# Patient Record
Sex: FEMALE | Race: ASIAN | NOT HISPANIC OR LATINO | Employment: UNEMPLOYED | URBAN - METROPOLITAN AREA
[De-identification: names, ages, dates, MRNs, and addresses within clinical notes are randomized per-mention and may not be internally consistent; named-entity substitution may affect disease eponyms.]

---

## 2022-12-06 PROBLEM — N91.2 AMENORRHEA: Status: ACTIVE | Noted: 2022-12-06

## 2022-12-06 NOTE — PROGRESS NOTES
Viability Scan Visit   2022    SUBJECTIVE:  CC: viability  HPI: Yennifer Rodriguez is a 32 y o   female who presents for a viability scan with an LMP of 10/4/2022  History reviewed  No pertinent past medical history  History reviewed  No pertinent surgical history  Social History     Tobacco Use   • Smoking status: Never   • Smokeless tobacco: Never   Substance Use Topics   • Alcohol use: Never   • Drug use: Never         Current Outpatient Medications:   •  doxylamine (UNISOM) 25 MG tablet, Take 1 tablet (25 mg total) by mouth daily at bedtime as needed for sleep, Disp: 30 tablet, Rfl: 1  •  ferrous sulfate 324 (65 Fe) mg, Take 1 tablet (324 mg total) by mouth 2 (two) times a day before meals, Disp: 90 tablet, Rfl: 1  •  folic acid (FOLVITE) 450 mcg tablet, Take 1 tablet (400 mcg total) by mouth daily, Disp: 90 tablet, Rfl: 1  •  Prenatal Vit-Fe Fumarate-FA (prenatal multivitamin) 28-0 8 MG TABS, Take 1 tablet by mouth daily, Disp: 30 tablet, Rfl: 0  •  pyridoxine (B-6) 25 MG tablet, Take 1 tablet (25 mg total) by mouth daily, Disp: 90 tablet, Rfl: 1      OBJECTIVE:  Vitals:    22 1158   BP: 120/85   BP Location: Right arm   Patient Position: Sitting   Cuff Size: Adult   Pulse: (!) 108   Resp: 18   Weight: 45 7 kg (100 lb 12 8 oz)   Height: 4' 10" (1 473 m)       GEN: The patient was alert and oriented x3, pleasant well-appearing female in no acute distress  PULM: nonlabored respirations  MSK: Normal gait  Skin: warm, dry  Neuro: no focal deficits  Psych: normal affect and judgement, cooperative  : Normal appearing external genitalia, vaginal mucosa, and cervix  Normal physiologic discharge present  No evidence of vaginal, cervical, or uterine bleeding  ASSESSMENT:  Yennifer Rodriguez is a 32 y o   female who presents for a viability scan      PLAN:  Problem List        Digestive    Nausea and vomiting during pregnancy    Overview     Vitamin B6 and unisom ordered            Other    Twin pregnancy in first trimester    Overview     - Dating by LMP with TAMIKO 7/11/2023, 1st tri ultrasounds consistent with this  - Likely [employer] however will await MFM U/S to confirm chorionicity  - Continue prenatal vitamin along with supplemental iron and folate due to multifetal gestation  - Prenatal labs: ordered  - Initial OB U/S: referral placed for MFM  - Genetic screening [ ]  - Order MSAFP?  [ ]   - Level 2 U/S: [ ]   - 28 week labs [ ]   - Tdap [ ]   - Rhogam [ ]   - Contraception plan: [ ]   - RTO in 4 weeks  - All questions answered to patient Kim Carbajal MD   PGY-2, OBGYN  12/08/22 12:59 PM

## 2022-12-08 ENCOUNTER — ULTRASOUND (OUTPATIENT)
Dept: OBGYN CLINIC | Facility: CLINIC | Age: 27
End: 2022-12-08

## 2022-12-08 VITALS
BODY MASS INDEX: 21.16 KG/M2 | HEIGHT: 58 IN | WEIGHT: 100.8 LBS | RESPIRATION RATE: 18 BRPM | HEART RATE: 108 BPM | DIASTOLIC BLOOD PRESSURE: 85 MMHG | SYSTOLIC BLOOD PRESSURE: 120 MMHG

## 2022-12-08 DIAGNOSIS — N91.2 AMENORRHEA: Primary | ICD-10-CM

## 2022-12-08 DIAGNOSIS — O21.9 NAUSEA AND VOMITING DURING PREGNANCY: ICD-10-CM

## 2022-12-08 DIAGNOSIS — O30.041 DICHORIONIC DIAMNIOTIC TWIN PREGNANCY IN FIRST TRIMESTER: ICD-10-CM

## 2022-12-08 PROBLEM — O30.001 TWIN PREGNANCY IN FIRST TRIMESTER: Status: ACTIVE | Noted: 2022-12-06

## 2022-12-08 RX ORDER — SWAB
1 SWAB, NON-MEDICATED MISCELLANEOUS DAILY
Qty: 30 TABLET | Refills: 0 | Status: SHIPPED | OUTPATIENT
Start: 2022-12-08

## 2022-12-08 RX ORDER — LANOLIN ALCOHOL/MO/W.PET/CERES
400 CREAM (GRAM) TOPICAL DAILY
Qty: 90 TABLET | Refills: 1 | Status: SHIPPED | OUTPATIENT
Start: 2022-12-08

## 2022-12-08 RX ORDER — FERROUS SULFATE TAB EC 324 MG (65 MG FE EQUIVALENT) 324 (65 FE) MG
324 TABLET DELAYED RESPONSE ORAL
Qty: 90 TABLET | Refills: 1 | Status: SHIPPED | OUTPATIENT
Start: 2022-12-08

## 2022-12-08 RX ORDER — PYRIDOXINE HCL (VITAMIN B6) 25 MG
25 TABLET ORAL DAILY
Qty: 90 TABLET | Refills: 1 | Status: SHIPPED | OUTPATIENT
Start: 2022-12-08

## 2022-12-20 ENCOUNTER — PATIENT OUTREACH (OUTPATIENT)
Dept: OBGYN CLINIC | Facility: CLINIC | Age: 27
End: 2022-12-20

## 2022-12-20 ENCOUNTER — INITIAL PRENATAL (OUTPATIENT)
Dept: OBGYN CLINIC | Facility: CLINIC | Age: 27
End: 2022-12-20

## 2022-12-20 DIAGNOSIS — Z34.91 PRENATAL CARE IN FIRST TRIMESTER: Primary | ICD-10-CM

## 2022-12-20 NOTE — LETTER
[facility] Letter    Travon Longoria  1995  [address] [place] [address]       12/20/22          Travon Longoria is a patient and under our care in our office  Maday Boss Estimated Date of Delivery: 7/11/23  Any questions or concerns feel free to contact our office       Thank you,  [address]  [id number] [facility]  [address] [facility]/Massiel Mejiadaniella [address] [place] [address]/[address]  [phone]

## 2022-12-20 NOTE — LETTER
Dentist Letter    Joyce Myers  1995  [facility] [place] [address]          12/20/22    We have had several requests from local dentist requesting permission to perform procedures on our patients who are pregnant  We wish to respond with this letter regarding some of the more routine procedures that we have been asked about  The following procedures may be performed on our obstetric patients:   1  Administration of local anesthesia   2  Administration of antibiotics such as PCN, Ampicillin, and Erythromycin  3  Administration of pain medications such as Tylenol, Tylenol with codeine, and if needed Percocet  4  Shielded X-rays    Should you have any questions, please do not hesitate to contact at [phone]          Sincerely,    1 [facility]   [phone]

## 2022-12-20 NOTE — LETTER
Proof of Pregnancy Letter    Momohermelindo Digna  1995  [address] [place] [address]        12/20/22      Vaibhav Sawyer is a patient at our facility  Vaibhav Sawyer Estimated Date of Delivery: 7/11/23       Any questions or concerns, please feel free to contact our office  Sincerely,    1 [facility]    [address] [facility], [facility]

## 2022-12-20 NOTE — PROGRESS NOTES
NAMITA BAUER spoke with 33 y/o-M-G1- Limited english speaking woman for prenatal assessment  Pt"s Aunt (Yoanna Tsai) assisted with interpretation during the assessment  Pt resides with her parents   is in [place]  Pt reported pregnancy was intended and is very happy  Pt denies any usage of drug, alcohol or smoking  Pt denies any Mental Health or Domestic Violence History  Pt is applying for MA and need to call [facility] for appointment  Pt is unemployed but denies financial concerns  Pt denies transportation issues  Pt denies any questions or concerns at this time  Pt is processing well the twin pregnancy  NAMITA BAUER explained her role and gave contact information

## 2022-12-20 NOTE — PROGRESS NOTES
OB INTAKE INTERVIEW  Pt presents for OB intake    OB History    Para Term  AB Living   1             SAB IAB Ectopic Multiple Live Births                  # Outcome Date GA Lbr Eduardo/2nd Weight Sex Delivery Anes PTL Lv   1 Current              Hx of  delivery prior to 36 weeks 6 days:  N/A   If yes, place a referral for cervical surveillance at 16 weeks  Last Menstrual Period:    Patient's last menstrual period was 10/04/2022 (exact date)  Ultrasound date: 2022  9 weeks 2 days     Estimated Date of Delivery: 23   by LMP  H&P visit scheduled  1/10/2023 @ 1130  with Dr Rolf Kendrick     Last pap smear: unknown, [facility]     Current Issues:  Constipation :   No  Headaches :   Yes  Cramping:  No  Spotting :   No  PICA cravings :  No  FOB Involved:   Yes  Planned pregnancy:  Yes  I have these concerns about this prenatal patient:   Pt able to understand most English with help of mother who was also present during visit  C/O nausea when taking iron supplements  States drinking tea helps   Encouraged to complete first trimester labs and schedule MFM ultrasounds  Pt reports being vegetarian  Does not eat meat  Will eat occasional eggs and drink milk  May need more in depth nutritional concealing re twin pregnancy      Interview education  • [facility] Pregnancy Essentials reviewed and discussed   • [facility] [address] Handout  • [facility] MFM Handout  • Discussed genetic testing  • Prenatal lab work: Scripts printed and given to pt  • Influenza vaccine given today: No  • Discussed Tdap vaccine  Immunizations: There is no immunization history on file for this patient  Depression Screening Follow-up Plan: Patient's depression screening was negative with an [place] score of  0  Clinically patient does not have depression  No treatment is required     Nurse/Family Partnership- referral placed:  Yes   If yes, place referral for nurse family partnership  BMI Counseling:  Tobacco Cessation Counseling: non-smoker  Infection Screening: Does the pt have a hx of MRSA? No  If yes- please follow MRSA protocol and obtain a nasal swab for MRSA culture  The patient was oriented to our practice and all questions were answered    Interviewed by: Jd Palencia RN 12/20/22

## 2022-12-20 NOTE — LETTER
Work Letter    Davin Babin  1995  [address] [place] [address]    Dear Davin Babin,      12/20/22        Your employee is a patient at [facility]  We recommend that all pregnant women:    1  Have a well-ventilated workspace  2  Wear low-heeled shoes  3  Work no more than 40 hours per week  4  Have a 15 minute break every 2 hours and at least 30 minutes for a meal break  5  Use good body mechanics by bending at your knees to avoid back strain and lift no more than 20 pounds without assistance  Will need assistance with lifting over 20 lbs  6  Have ready access to bathrooms and water  She may continue to work until her due date unless medical complications arise  We anticipate she may return to work in 6-8 weeks after delivery       Sincerely,  1 Brandie Rodney

## 2022-12-28 ENCOUNTER — APPOINTMENT (OUTPATIENT)
Dept: LAB | Facility: CLINIC | Age: 27
End: 2022-12-28

## 2022-12-28 DIAGNOSIS — O30.041 DICHORIONIC DIAMNIOTIC TWIN PREGNANCY IN FIRST TRIMESTER: ICD-10-CM

## 2022-12-28 LAB
ABO GROUP BLD: NORMAL
BACTERIA UR QL AUTO: ABNORMAL /HPF
BASOPHILS # BLD AUTO: 0.03 THOUSANDS/ÂΜL (ref 0–0.1)
BASOPHILS NFR BLD AUTO: 0 % (ref 0–1)
BILIRUB UR QL STRIP: NEGATIVE
BLD GP AB SCN SERPL QL: NEGATIVE
CAOX CRY URNS QL MICRO: ABNORMAL /HPF
CLARITY UR: ABNORMAL
COLOR UR: YELLOW
EOSINOPHIL # BLD AUTO: 0.03 THOUSAND/ÂΜL (ref 0–0.61)
EOSINOPHIL NFR BLD AUTO: 0 % (ref 0–6)
ERYTHROCYTE [DISTWIDTH] IN BLOOD BY AUTOMATED COUNT: 15.9 % (ref 11.6–15.1)
GLUCOSE UR STRIP-MCNC: NEGATIVE MG/DL
HBV SURFACE AG SER QL: NORMAL
HCT VFR BLD AUTO: 29.6 % (ref 34.8–46.1)
HGB BLD-MCNC: 10 G/DL (ref 11.5–15.4)
HGB UR QL STRIP.AUTO: NEGATIVE
HIV 1+2 AB+HIV1 P24 AG SERPL QL IA: NORMAL
HIV 2 AB SERPL QL IA: NORMAL
HIV1 AB SERPL QL IA: NORMAL
HIV1 P24 AG SERPL QL IA: NORMAL
IMM GRANULOCYTES # BLD AUTO: 0.04 THOUSAND/UL (ref 0–0.2)
IMM GRANULOCYTES NFR BLD AUTO: 0 % (ref 0–2)
KETONES UR STRIP-MCNC: NEGATIVE MG/DL
LEUKOCYTE ESTERASE UR QL STRIP: ABNORMAL
LYMPHOCYTES # BLD AUTO: 1.85 THOUSANDS/ÂΜL (ref 0.6–4.47)
LYMPHOCYTES NFR BLD AUTO: 18 % (ref 14–44)
MCH RBC QN AUTO: 24.4 PG (ref 26.8–34.3)
MCHC RBC AUTO-ENTMCNC: 33.8 G/DL (ref 31.4–37.4)
MCV RBC AUTO: 72 FL (ref 82–98)
MONOCYTES # BLD AUTO: 0.78 THOUSAND/ÂΜL (ref 0.17–1.22)
MONOCYTES NFR BLD AUTO: 8 % (ref 4–12)
MUCOUS THREADS UR QL AUTO: ABNORMAL
NEUTROPHILS # BLD AUTO: 7.56 THOUSANDS/ÂΜL (ref 1.85–7.62)
NEUTS SEG NFR BLD AUTO: 74 % (ref 43–75)
NITRITE UR QL STRIP: POSITIVE
NON-SQ EPI CELLS URNS QL MICRO: ABNORMAL /HPF
NRBC BLD AUTO-RTO: 0 /100 WBCS
PH UR STRIP.AUTO: 5.5 [PH]
PLATELET # BLD AUTO: 296 THOUSANDS/UL (ref 149–390)
PMV BLD AUTO: 9.6 FL (ref 8.9–12.7)
PROT UR STRIP-MCNC: ABNORMAL MG/DL
RBC # BLD AUTO: 4.1 MILLION/UL (ref 3.81–5.12)
RBC #/AREA URNS AUTO: ABNORMAL /HPF
RH BLD: NEGATIVE
RUBV IGG SERPL IA-ACNC: >175 IU/ML
SP GR UR STRIP.AUTO: 1.02 (ref 1–1.03)
SPECIMEN EXPIRATION DATE: NORMAL
UROBILINOGEN UR STRIP-ACNC: <2 MG/DL
WBC # BLD AUTO: 10.29 THOUSAND/UL (ref 4.31–10.16)
WBC #/AREA URNS AUTO: ABNORMAL /HPF

## 2022-12-29 DIAGNOSIS — O99.891 BACTERIURIA DURING PREGNANCY: Primary | ICD-10-CM

## 2022-12-29 DIAGNOSIS — R82.71 BACTERIURIA DURING PREGNANCY: Primary | ICD-10-CM

## 2022-12-29 LAB — RPR SER QL: NORMAL

## 2022-12-29 RX ORDER — AMOXICILLIN AND CLAVULANATE POTASSIUM 875; 125 MG/1; MG/1
1 TABLET, FILM COATED ORAL EVERY 12 HOURS SCHEDULED
Qty: 10 TABLET | Refills: 0 | Status: SHIPPED | OUTPATIENT
Start: 2022-12-29 | End: 2023-01-03

## 2022-12-31 LAB — BACTERIA UR CULT: ABNORMAL

## 2023-01-09 ENCOUNTER — ROUTINE PRENATAL (OUTPATIENT)
Dept: PERINATAL CARE | Facility: OTHER | Age: 28
End: 2023-01-09

## 2023-01-09 VITALS
HEIGHT: 58 IN | DIASTOLIC BLOOD PRESSURE: 70 MMHG | BODY MASS INDEX: 20.7 KG/M2 | HEART RATE: 112 BPM | SYSTOLIC BLOOD PRESSURE: 110 MMHG | WEIGHT: 98.6 LBS

## 2023-01-09 DIAGNOSIS — O30.031 MONOCHORIONIC DIAMNIOTIC TWIN GESTATION IN FIRST TRIMESTER: Primary | ICD-10-CM

## 2023-01-09 DIAGNOSIS — O30.041 DICHORIONIC DIAMNIOTIC TWIN PREGNANCY IN FIRST TRIMESTER: ICD-10-CM

## 2023-01-09 DIAGNOSIS — Z36.82 ENCOUNTER FOR NUCHAL TRANSLUCENCY TESTING: ICD-10-CM

## 2023-01-09 DIAGNOSIS — Z3A.13 13 WEEKS GESTATION OF PREGNANCY: ICD-10-CM

## 2023-01-09 RX ORDER — ASPIRIN 81 MG/1
162 TABLET ORAL DAILY
Qty: 180 TABLET | Refills: 3 | Status: SHIPPED | OUTPATIENT
Start: 2023-01-09

## 2023-01-09 RX ORDER — FOLIC ACID 1 MG/1
1 TABLET ORAL DAILY
Qty: 30 TABLET | Refills: 3 | Status: SHIPPED | OUTPATIENT
Start: 2023-01-09 | End: 2023-02-08

## 2023-01-09 RX ORDER — UREA 10 %
1 LOTION (ML) TOPICAL 2 TIMES DAILY
Qty: 180 TABLET | Refills: 2 | Status: SHIPPED | OUTPATIENT
Start: 2023-01-09 | End: 2023-04-09

## 2023-01-09 RX ORDER — FERROUS SULFATE TAB EC 324 MG (65 MG FE EQUIVALENT) 324 (65 FE) MG
324 TABLET DELAYED RESPONSE ORAL
Qty: 60 TABLET | Refills: 3 | Status: SHIPPED | OUTPATIENT
Start: 2023-01-09

## 2023-01-09 NOTE — PROGRESS NOTES
Autumn Dailey presents today for a genetic screening ultrasound  This is her first pregnancy  This is a spontaneously conceived twin pregnancy  Review of her semester ultrasound performed in the clinic clearly demonstrates a monochorionic twin pregnancy  She has no significant medical or surgical history  She takes prenatal vitamins  She has no significant substance use history or family medical history  A review of systems is significant for nausea vomiting in pregnancy for which she is taking vitamin B6  Please note, I communicated the patient utilizing [address] [facility]  [id number]  We discussed the options for genetic screening, including but not limited to first trimester screening, second trimester screening, combined first and second trimester screening, noninvasive prenatal testing (NIPT) for patients at high risk and diagnostic screening through the use of CVS and amniocentesis  We discussed the risks and benefits of each approach including the sensitivities and false positive rates as well as the difference between a screening test and a diagnostic test  At the conclusion of our discussion the patient declined maternal serum screening to delineate her risk for fetal aneuploidy  We discussed the increased maternal and fetal risks with multiple gestations  We reviewed specifically the increased risks associated with monochorionic twins which include, but are not limited to, increased risk for congenital birth defects and heart defects requiring fetal echocardiogram   We reviewed the increased risks of twin-twin transfusion and twin anemia polycythemia sequence which occurs approximately 10 to 15% of all monochorionic twins  We reviewed typical monitoring plan for monochorionic twin diamniotic twins which is twin-twin transfusion monitoring every 2 weeks starting at 16 weeks and fetal growth every 4 weeks starting at 16 weeks    An anatomy ultrasound is recommended at 20 weeks and a fetal echocardiogram is recommended at around 22 weeks  We recommend twice weekly antepartum surveillance starting at 30-32 weeks gestation  We reviewed the increased medical maternal complications of pregnancy including hypertensive disorders, gestational diabetes, peripartum cardiomyopathy, postpartum hemorrhage, malpresentation, and  delivery  We discussed increased fetal risks in pregnancy including discordant anomalies,  birth, and fetal growth restriction  We discussed that the mean gestational age of delivery of twins is 35 3 weeks, with 58 8% delivering less than 37 weeks gestation  I recommend delivery between 36-37 6/7 weeks in appropriately grown uncomplicated monochorionic twin pregnancies given the increased  morbidity and mortality outside of this range  Regarding micronutrient supplementation, I advise supplemental folic acid (1mg) as well as ferrous sulfate (extra 60-80mg iron) and calcium (1500mg)  For twin pregnancy, the IOM recommends a gestational weight gain of 37-54 lb for women of normal weight (BMI 18 5-24 9), 31-50 lb for overweight women (BMI 25-29 9), and 25-42 lb for obese women (BMI >30)  Glucola screening is traditionally recommended between 24 and 28 weeks gestation  There is an increased risk for ischemic placental disease and preeclampsia in twin pregnancies; therefore, recommend initiating low-dose aspirin therapy (162mg) in the 1st trimester which has been demonstrated to mitigate the risk for preeclampsia, fetal growth restriction, and in some cases,  birh  Thank you very much for allowing us to participate in the care of this very nice patient  Should you have any questions, please do not hesitate to contact our office  Portions of the record may have been created with voice recognition software  Occasional wrong word or "sound a like" substitutions may have occurred due to the inherent limitations of voice recognition software   Read the chart carefully and recognize, using context, where substitutions have occurred

## 2023-01-09 NOTE — LETTER
January 9, 2023     Matheus Mejia MD  [address]    Patient: Savana Marvin   YOB: 1995   Date of Visit: 1/9/2023       Dear Dr María Rodríguez: Thank you for referring Savana Marvin to me for evaluation  Below are my notes for this consultation  If you have questions, please do not hesitate to call me  I look forward to following your patient along with you  Sincerely,        Davide Wilkinson MD        CC: No Recipients  Davide Wilkinson MD  1/9/2023  9:55 AM  Sign when Signing Visit  Ryan Rodriguez presents today for a genetic screening ultrasound  This is her first pregnancy  This is a spontaneously conceived twin pregnancy  Review of her semester ultrasound performed in the clinic clearly demonstrates a monochorionic twin pregnancy  She has no significant medical or surgical history  She takes prenatal vitamins  She has no significant substance use history or family medical history  A review of systems is significant for nausea vomiting in pregnancy for which she is taking vitamin B6  Please note, I communicated the patient utilizing [facility]  [id number]  We discussed the options for genetic screening, including but not limited to first trimester screening, second trimester screening, combined first and second trimester screening, noninvasive prenatal testing (NIPT) for patients at high risk and diagnostic screening through the use of CVS and amniocentesis  We discussed the risks and benefits of each approach including the sensitivities and false positive rates as well as the difference between a screening test and a diagnostic test  At the conclusion of our discussion the patient declined maternal serum screening to delineate her risk for fetal aneuploidy  We discussed the increased maternal and fetal risks with multiple gestations    We reviewed specifically the increased risks associated with monochorionic twins which include, but are not limited to, increased risk for congenital birth defects and heart defects requiring fetal echocardiogram   We reviewed the increased risks of twin-twin transfusion and twin anemia polycythemia sequence which occurs approximately 10 to 15% of all monochorionic twins  We reviewed typical monitoring plan for monochorionic twin diamniotic twins which is twin-twin transfusion monitoring every 2 weeks starting at 16 weeks and fetal growth every 4 weeks starting at 16 weeks  An anatomy ultrasound is recommended at 20 weeks and a fetal echocardiogram is recommended at around 22 weeks  We recommend twice weekly antepartum surveillance starting at 30-32 weeks gestation  We reviewed the increased medical maternal complications of pregnancy including hypertensive disorders, gestational diabetes, peripartum cardiomyopathy, postpartum hemorrhage, malpresentation, and  delivery  We discussed increased fetal risks in pregnancy including discordant anomalies,  birth, and fetal growth restriction  We discussed that the mean gestational age of delivery of twins is 35 3 weeks, with 58 8% delivering less than 37 weeks gestation  I recommend delivery between 36-37 6/7 weeks in appropriately grown uncomplicated monochorionic twin pregnancies given the increased  morbidity and mortality outside of this range  Regarding micronutrient supplementation, I advise supplemental folic acid (1mg) as well as ferrous sulfate (extra 60-80mg iron) and calcium (1500mg)  For twin pregnancy, the IOM recommends a gestational weight gain of 37-54 lb for women of normal weight (BMI 18 5-24 9), 31-50 lb for overweight women (BMI 25-29 9), and 25-42 lb for obese women (BMI >30)  Glucola screening is traditionally recommended between 24 and 28 weeks gestation   There is an increased risk for ischemic placental disease and preeclampsia in twin pregnancies; therefore, recommend initiating low-dose aspirin therapy (162mg) in the 1st trimester which has been demonstrated to mitigate the risk for preeclampsia, fetal growth restriction, and in some cases,  birh  Thank you very much for allowing us to participate in the care of this very nice patient  Should you have any questions, please do not hesitate to contact our office  Portions of the record may have been created with voice recognition software  Occasional wrong word or "sound a like" substitutions may have occurred due to the inherent limitations of voice recognition software  Read the chart carefully and recognize, using context, where substitutions have occurred

## 2023-01-10 ENCOUNTER — ROUTINE PRENATAL (OUTPATIENT)
Dept: OBGYN CLINIC | Facility: CLINIC | Age: 28
End: 2023-01-10

## 2023-01-10 VITALS
DIASTOLIC BLOOD PRESSURE: 80 MMHG | HEIGHT: 58 IN | BODY MASS INDEX: 20.57 KG/M2 | WEIGHT: 98 LBS | HEART RATE: 102 BPM | SYSTOLIC BLOOD PRESSURE: 122 MMHG

## 2023-01-10 DIAGNOSIS — Z34.92 PRENATAL CARE IN SECOND TRIMESTER: Primary | ICD-10-CM

## 2023-01-10 DIAGNOSIS — B96.29 UTI DUE TO EXTENDED-SPECTRUM BETA LACTAMASE (ESBL) PRODUCING ESCHERICHIA COLI: ICD-10-CM

## 2023-01-10 DIAGNOSIS — O30.031 MONOCHORIONIC DIAMNIOTIC TWIN GESTATION IN FIRST TRIMESTER: ICD-10-CM

## 2023-01-10 DIAGNOSIS — Z16.12 UTI DUE TO EXTENDED-SPECTRUM BETA LACTAMASE (ESBL) PRODUCING ESCHERICHIA COLI: ICD-10-CM

## 2023-01-10 DIAGNOSIS — Z72.51 HIGH RISK HETEROSEXUAL BEHAVIOR: ICD-10-CM

## 2023-01-10 DIAGNOSIS — Z11.3 SCREEN FOR STD (SEXUALLY TRANSMITTED DISEASE): ICD-10-CM

## 2023-01-10 DIAGNOSIS — Z23 NEED FOR INFLUENZA VACCINATION: ICD-10-CM

## 2023-01-10 DIAGNOSIS — N39.0 UTI DUE TO EXTENDED-SPECTRUM BETA LACTAMASE (ESBL) PRODUCING ESCHERICHIA COLI: ICD-10-CM

## 2023-01-10 DIAGNOSIS — B37.31 YEAST VAGINITIS: ICD-10-CM

## 2023-01-10 DIAGNOSIS — Z3A.14 14 WEEKS GESTATION OF PREGNANCY: ICD-10-CM

## 2023-01-10 RX ORDER — NITROFURANTOIN 25; 75 MG/1; MG/1
100 CAPSULE ORAL 2 TIMES DAILY
Qty: 14 CAPSULE | Refills: 0 | Status: SHIPPED | OUTPATIENT
Start: 2023-01-10 | End: 2023-01-17

## 2023-01-10 NOTE — ASSESSMENT & PLAN NOTE
Previously treated with Augmentin which was shown to have intermediate sensitivity on culture results  - Will treat with Macrobid BID x 7 days   - Repeat urine culture in 3 weeks

## 2023-01-10 NOTE — PROGRESS NOTES
OB/GYN  PRENATAL H&P VISIT  Dennis Glass  1/10/2023  12:24 PM  Dr Live Malik MD      SUBJECTIVE  Patient is a  at 14w0d with monochorionic diamniotic twin pregnancy here for initial prenatal H&P  This is an intended and desired pregnancy  She is currently doing well  She is not currently employed  Reports having some nausea and vomiting  Not currently taking anything     Currently lives with her mother, father and brother  FOB currently lives in [facility]  Feels well-supported at home  No medications  No surgeries  She denies hx of STD/STI, denies a hx of TB or close contacts with persons with TB  She never had MRSA  She denies a family history of inheritable conditions such as physical or intellectual disabilities, birth defects, blood disorders, heart or neural tube defects  She recently traveled to [place]  No future travel planned    She denies use of nicotine or recreational drug use  She denies use of ETOH  She denies vaginal bleeding, cramping, leakage, abnormal discharge  OB History    Para Term  AB Living   1 0 0 0 0 0   SAB IAB Ectopic Multiple Live Births   0 0 0 0 0      # Outcome Date GA Lbr Eduardo/2nd Weight Sex Delivery Anes PTL Lv   1 Current                Review of Systems   Constitutional: Negative for chills and fever  HENT: Negative  Eyes: Negative for visual disturbance  Respiratory: Negative for shortness of breath  Cardiovascular: Negative for chest pain  Gastrointestinal: Positive for nausea and vomiting  Negative for abdominal pain, constipation and diarrhea  Genitourinary: Negative for dysuria, hematuria, vaginal bleeding, vaginal discharge and vaginal pain  Neurological: Negative for headaches  No past medical history on file  No past surgical history on file      Social History     Socioeconomic History   • Marital status: /Civil Union     Spouse name: Not on file   • Number of children: 0   • Years of education: 12   • Highest education level: High school graduate   Occupational History   • Not on file   Tobacco Use   • Smoking status: Never   • Smokeless tobacco: Never   Vaping Use   • Vaping Use: Never used   Substance and Sexual Activity   • Alcohol use: Never   • Drug use: Never   • Sexual activity: Yes     Partners: Male   Other Topics Concern   • Not on file   Social History Narrative   • Not on file     Social Determinants of Health     Financial Resource Strain: Low Risk    • Difficulty of Paying Living Expenses: Not hard at all   Food Insecurity: No Food Insecurity   • Worried About [address] [employer] in the Last Year: Never true   • Ran Out of Food in the Last Year: Never true   Transportation Needs: No Transportation Needs   • Lack of Transportation (Medical): No   • Lack of Transportation (Non-Medical): No   Physical Activity: Not on file   Stress: No Stress Concern Present   • Feeling of Stress : Only a little   Social Connections: Not on file   Intimate Partner Violence: Not on file   Housing Stability: Low Risk    • Unable to Pay for Housing in the Last Year: No   • Number of Places Lived in the Last Year: 1   • Unstable Housing in the Last Year: No       OBJECTIVE  Vitals:    01/10/23 1200   BP: 122/80   Pulse: 102     Physical Exam  Constitutional:       General: She is not in acute distress  Appearance: Normal appearance  She is well-developed and normal weight  She is not ill-appearing, toxic-appearing or diaphoretic  Genitourinary:      Vulva normal       Genitourinary Comments: Grossly normal appearing cervix  Copious thick white/yellow discharge throughout vagina present  Non foul smelling  No bleeding or cervical motion tenderness present   HENT:      Head: Normocephalic and atraumatic  Eyes:      General: No scleral icterus  Conjunctiva/sclera: Conjunctivae normal    Cardiovascular:      Rate and Rhythm: Normal rate     Pulmonary:      Effort: Pulmonary effort is normal    Abdominal: Palpations: Abdomen is soft  There is no mass  Tenderness: There is no abdominal tenderness  There is no guarding or rebound  Musculoskeletal:      Right lower leg: No edema  Left lower leg: No edema  Neurological:      General: No focal deficit present  Mental Status: She is alert and oriented to person, place, and time  Skin:     General: Skin is warm and dry  Psychiatric:         Mood and Affect: Mood normal          Behavior: Behavior normal    Vitals reviewed  FHT A - 150 bpm   FHT B - 148 bpm      ASSESSMENT AND PLAN    32 y o , , with /80   Pulse 102   Ht 4' 10" (1 473 m)   Wt 44 5 kg (98 lb)   LMP 10/04/2022 (Exact Date) , at 14w0d with mono-di twin gestation here for her prenatal H&P  Pregnancy: H&P completed today  PN Labs reviewed today - blood work WNL  Needs Hep C screening  Labor expectations discussed with patient, including appointment schedule, nutrition, exercise, medications, sexual intercourse, and nausea/vomiting  Patient's BMI is 20 48  Recommended weight gain is 25-40 lbs  - Continue daily ASA for PreE ppx    Screening: Pap smear collected  GC/CT collected  Patient declined genetic screening    - Flu vaccine given today    Consents: Delivery process including potential OVD and  reviewed  Sign delivery consent form at 28 weeks  Follow up: RTC in 4 weeks  Precautions regarding labor, leakage, bleeding, and fetal movement reviewed      Problem List Items Addressed This Visit        Genitourinary    UTI due to extended-spectrum beta lactamase (ESBL) producing Escherichia coli     Previously treated with Augmentin which was shown to have intermediate sensitivity on culture results  - Will treat with Macrobid BID x 7 days   - Repeat urine culture in 3 weeks          Relevant Medications    nitrofurantoin (MACROBID) 100 mg capsule    Yeast vaginitis     Likely secondary to recent abx use  Monistat vaginal suppository qd x 7d Relevant Medications    miconazole (MONISTAT 7) 100 mg vaginal suppository       Other    Monochorionic diamniotic twin gestation in first trimester    14 weeks gestation of pregnancy    Relevant Medications    miconazole (MONISTAT 7) 100 mg vaginal suppository    Other Relevant Orders    Hepatitis C antibody   Other Visit Diagnoses     Prenatal care in second trimester    -  Primary    Relevant Orders    Hepatitis C antibody    Liquid-based pap, screening    Chlamydia/GC amplified DNA by PCR    FLUZONE: influenza vaccine, quadrivalent, 0 5 mL (Completed)    Screen for STD (sexually transmitted disease)        Relevant Orders    Chlamydia/GC amplified DNA by PCR    High risk heterosexual behavior        Relevant Orders    Chlamydia/GC amplified DNA by PCR    Need for influenza vaccination        Relevant Orders    FLUZONE: influenza vaccine, quadrivalent, 0 5 mL (Completed)            Live Malik MD  1/10/2023  12:24 PM

## 2023-01-12 LAB
C TRACH DNA SPEC QL NAA+PROBE: NEGATIVE
N GONORRHOEA DNA SPEC QL NAA+PROBE: NEGATIVE

## 2023-01-18 LAB
LAB AP GYN PRIMARY INTERPRETATION: NORMAL
Lab: NORMAL
PATH INTERP SPEC-IMP: NORMAL

## 2023-01-24 PROBLEM — Z3A.16 16 WEEKS GESTATION OF PREGNANCY: Status: ACTIVE | Noted: 2023-01-10

## 2023-01-25 ENCOUNTER — ULTRASOUND (OUTPATIENT)
Dept: PERINATAL CARE | Facility: OTHER | Age: 28
End: 2023-01-25

## 2023-01-25 ENCOUNTER — APPOINTMENT (OUTPATIENT)
Dept: LAB | Facility: CLINIC | Age: 28
End: 2023-01-25

## 2023-01-25 ENCOUNTER — TELEPHONE (OUTPATIENT)
Dept: PERINATAL CARE | Facility: CLINIC | Age: 28
End: 2023-01-25

## 2023-01-25 VITALS
HEIGHT: 58 IN | DIASTOLIC BLOOD PRESSURE: 74 MMHG | BODY MASS INDEX: 21.58 KG/M2 | WEIGHT: 102.8 LBS | SYSTOLIC BLOOD PRESSURE: 118 MMHG | HEART RATE: 107 BPM

## 2023-01-25 DIAGNOSIS — Z3A.14 14 WEEKS GESTATION OF PREGNANCY: ICD-10-CM

## 2023-01-25 DIAGNOSIS — O30.031 MONOCHORIONIC DIAMNIOTIC TWIN GESTATION IN FIRST TRIMESTER: Primary | ICD-10-CM

## 2023-01-25 DIAGNOSIS — Z3A.16 16 WEEKS GESTATION OF PREGNANCY: ICD-10-CM

## 2023-01-25 DIAGNOSIS — Z34.92 PRENATAL CARE IN SECOND TRIMESTER: ICD-10-CM

## 2023-01-25 LAB — HCV AB SER QL: NORMAL

## 2023-01-25 NOTE — TELEPHONE ENCOUNTER
Received In- basket message from Dr Pérez Abraham - patient may need future referral to [employer], request to fax patient demographics/insurance to [facility] to initiate  insurance authorization  Upon Epic review patient was given self pay estimate for MFM visit today- no insurance active or pending  Phone call to [facility], s/w financial counselor Bianca Joshua, patient did not f/u with [employer] application, requested they contact patient  Phone call to [facility] MFM office, s/w Mary Magallon @ # [phone] patient can be referred for care however she must reach out to the [address] @ [phone] first    If patient is actively working w/ [employer] she can be scheduled  Phone call to [address] [place], [place] [address]janine Yordy via [address]    ( [facility]  # [id number]) explained to patient she needs to contact [address] Davionfloydlopez #'s provided   [phone]  Explained to patient if she needs care at [facility] she must f/u with the financial department  [facility] M[facility] cannot call for her, she will need to answer personal financial questions to apply for this assistance  Patient verbalized understanding    Confirmed next MFM appt 2/2/23 @  ( Dr Alberto Mina)

## 2023-01-25 NOTE — TELEPHONE ENCOUNTER
PRATIK US reports from 1/09/23 and today 1/25/23 faxed to [facility] [phone] , [facility] PRATIK received fax back records sent sent "Complete" @14:14

## 2023-01-25 NOTE — PROGRESS NOTES
Koffi Cao: Ms Nadia Stacy was seen today for TTTS surveillance  See ultrasound report under "OB Procedures" tab  The time spent on this established patient on the encounter date included 5 minutes previsit service time reviewing records and precharting, 15 minutes face-to-face service time counseling regarding results and coordinating care including use of Michael  and discuss case with [facility], and  5 minutes charting, totalling 25 minutes    Please don't hesitate to contact our office with any concerns or questions   -Byron Pruitt MD

## 2023-01-25 NOTE — PATIENT INSTRUCTIONS
Thank you for choosing us for your  care today  If you have any questions about your ultrasound or care, please do not hesitate to contact us or your primary obstetrician  Some general instructions for your pregnancy are:    Protect against coronavirus: get vaccinated - pregnant women are increased risk of severe COVID  Notify your primary care doctor if you have any symptoms  Exercise: Aim for 22 minutes per day (150 minutes per week) of regular exercise  Walking is great! Nutrition: aim for calcium-rich and iron-rich foods as well as healthy sources of protein  Learn about Preeclampsia: preeclampsia is a common, serious high blood pressure complication in pregnancy  A blood pressure of 132JMUW (systolic or top number) or 86JGPV (diastolic or bottom number) is not normal and needs evaluation by your doctor  Aspirin is sometimes prescribed in early pregnancy to prevent preeclampsia in women with risk factors - ask your obstetrician if you should be on this medication  If you smoke, try to reduce how many cigarettes you smoke or try to quit completely  Do not vape  Other warning signs to watch out for in pregnancy or postpartum: chest pain, obstructed breathing or shortness of breath, seizures, thoughts of hurting yourself or your baby, bleeding, a painful or swollen leg, fever, or headache (see [facility] POST-BIRTH Warning Signs campaign)  If these happen call 911  Itching is also not normal in pregnancy and if you experience this, especially over your hands and feet, potentially worse at night, notify your doctors

## 2023-02-01 ENCOUNTER — TELEPHONE (OUTPATIENT)
Dept: PERINATAL CARE | Facility: CLINIC | Age: 28
End: 2023-02-01

## 2023-02-01 DIAGNOSIS — O30.031 MONOCHORIONIC DIAMNIOTIC TWIN GESTATION IN FIRST TRIMESTER: ICD-10-CM

## 2023-02-01 RX ORDER — FOLIC ACID 1 MG/1
TABLET ORAL
Qty: 90 TABLET | Refills: 2 | Status: SHIPPED | OUTPATIENT
Start: 2023-02-01 | End: 2023-02-09

## 2023-02-01 NOTE — TELEPHONE ENCOUNTER
2/2/23 US @ [facility] PRATIK todd/ Dr Emy Manning

## 2023-02-01 NOTE — TELEPHONE ENCOUNTER
Phone call to patient via Michael  Deena Lee # [id number]  Confirmed MFM [address] appt 2/2/23 arrival 11:15 for US TTTS check  Patient states she has [place] but has not heard any information regarding approval   Encouraged patient to contact Dick Ambrocio office, even if [place] if [place], [employer]  is out of state coverage and per [facility] financial department Carlee Green) patient needs to contact them and answer financial questions prior to appts scheduled  Patient provided phone numbers for [facility] [place] hotline for financial assistance department and encouraged to her to call today  [phone]  Patient verbalized understanding and states she will have her brother call

## 2023-02-02 ENCOUNTER — TELEPHONE (OUTPATIENT)
Dept: PERINATAL CARE | Facility: CLINIC | Age: 28
End: 2023-02-02

## 2023-02-02 PROBLEM — O30.032 MONOCHORIONIC DIAMNIOTIC TWIN GESTATION IN SECOND TRIMESTER: Status: ACTIVE | Noted: 2022-12-06

## 2023-02-02 PROBLEM — F80.9 COMMUNICATION PROBLEM: Status: ACTIVE | Noted: 2023-02-02

## 2023-02-02 PROBLEM — Z3A.17 17 WEEKS GESTATION OF PREGNANCY: Status: ACTIVE | Noted: 2023-01-10

## 2023-02-02 NOTE — TELEPHONE ENCOUNTER
[address] message from Dr Jamse Givens- spoke with relative and they would like to try to go to [facility]  S/w Dr Silvia Fam - patient would like her brother or his wife Dr Arina Almonte contacted @ [phone]     3 way call to to patient and her brother David Boggs, patient gave her verbal consent that medical information can be discussed  Per Kevan  Patient would like to have her care at [facility]  He states an application was submitted for [employer], initially was denied due to paper work issue however new documents were submitted, no determination yet  Tata Davis was unaware that patient had received information on [address] [place] [address] [employer] assistance program however they now want to go to [facility]  Explained patients records are faxed to [address] [facility] and phone call will be made to Nurse Navigator @ [facility]  Provided them w/ [facility] contact #  Offered for them to contact [address] [place] [address] services for additional questions about insurance process Josue Israel [phone])   C/ Ronald Stevens 77 reports, OB records, patient demographics and copy of drivers license faxed to [facility] 'Complete" @ [phone]  Phone call to [facility], intake info given to [facility] and s/w nurse navigator Lucy Barajas [phone]  Lucy Barajas asked for Dr Zaheer Mccauley contact # for [facility] Dr Chrystal Mckeon  Phone call to Dr Silvia Fam and updated and received confirmation to share his cell # while on call w/ Lucy Barajas  Per Ary [facility] received US reports and will review

## 2023-02-03 NOTE — TELEPHONE ENCOUNTER
[facility]w Donna Madsen [phone], patient is scheduled Monday 2/6/23

## 2023-02-07 ENCOUNTER — TELEPHONE (OUTPATIENT)
Dept: PERINATAL CARE | Facility: CLINIC | Age: 28
End: 2023-02-07

## 2023-02-07 ENCOUNTER — ROUTINE PRENATAL (OUTPATIENT)
Dept: OBGYN CLINIC | Facility: CLINIC | Age: 28
End: 2023-02-07

## 2023-02-07 ENCOUNTER — PREP FOR PROCEDURE (OUTPATIENT)
Dept: OBGYN CLINIC | Facility: CLINIC | Age: 28
End: 2023-02-07

## 2023-02-07 ENCOUNTER — TELEPHONE (OUTPATIENT)
Dept: OBGYN CLINIC | Facility: CLINIC | Age: 28
End: 2023-02-07

## 2023-02-07 VITALS
DIASTOLIC BLOOD PRESSURE: 90 MMHG | SYSTOLIC BLOOD PRESSURE: 151 MMHG | HEIGHT: 58 IN | WEIGHT: 106 LBS | BODY MASS INDEX: 22.25 KG/M2 | HEART RATE: 97 BPM

## 2023-02-07 DIAGNOSIS — O02.1 MISSED ABORTION: Primary | ICD-10-CM

## 2023-02-07 DIAGNOSIS — O43.022 TWIN TO TWIN TRANSFUSION IN SECOND TRIMESTER: Primary | ICD-10-CM

## 2023-02-07 PROBLEM — Z3A.18 18 WEEKS GESTATION OF PREGNANCY: Status: ACTIVE | Noted: 2023-01-10

## 2023-02-07 NOTE — PROGRESS NOTES
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  She was seen at both [facility] this week as well as [facility] this morning  She has decided to proceed with termination of pregnancy  She is appropriately upset  I expressed my condolences  History reviewed  No pertinent past medical history  History reviewed  No pertinent surgical history  Objective:    Vitals: Blood pressure 151/90, pulse 97, height 4' 10" (1 473 m), weight 48 1 kg (106 lb), last menstrual period 10/04/2022  Body mass index is 22 15 kg/m²  Physical Exam  Constitutional:       General: She is not in acute distress  HENT:      Head: Normocephalic  Mouth/Throat:      Mouth: Mucous membranes are moist    Cardiovascular:      Rate and Rhythm: Normal rate  Pulmonary:      Effort: No respiratory distress  Abdominal:      General: Abdomen is flat  Palpations: Abdomen is soft  Musculoskeletal:         General: Normal range of motion  Skin:     General: Skin is warm and dry  Neurological:      Mental Status: She is alert and oriented to person, place, and time  Psychiatric:         Behavior: Behavior normal            Assessment/Plan:  Problem List Items Addressed This Visit        Other    Twin to twin transfusion in second trimester - Primary     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  · Patient was counseled in detail about options of expectant management, induction of labor, and D&E  · After discussing through the [address]  and with her sister in law (Dr Vito Juniors), Diego Yanes opted for a D&E   She would like footprints if it is possible, but understands it may not be possible  · We discussed this procedure in detail, preoperative cervical preparation with laminaria, operative technique, and surgical risks, including but not limited to bleeding, infection, injury to surrounding structures, uterine perforation, intrauterine scarring, and incomplete evacuation  We discussed that while risks of complication are low, the can be severe and may require more extensive surgical intervention (laparoscopy, laparotomy, or hysterectomy)  Reviewed same day surgery, expected recovery course, and follow up  Patient had all questions answered to her satisfaction  Informed consent was obtained  · In accordance with state guidelines, I reviewed with the patient the [facility] 24hr waiting protocol  We discussed her alternatives to D&E as above  She was offered to view the [facility] document on stages of pregnancy and declined; she was counseled that this resource is available online should she prefer to review later  She had no additional questions regarding this information  · She consents to blood transfusion, intraop photos, and CPR if needed  · She will actually come see me in the [facility] office for laminaria placement at 1:30 PM tomorrow (due to covering clinic attending not placing lams) and will be placed on the surgical schedule for her procedure Thursday afternoon  · Will send doxycycline tomorrow at time of laminaria placement  · She will need to sign Hospital Dispo paperwork (>16 weeks) which was not available in the clinic at time of her visit as well as the induced termination of pregnancy form  · She will require RhoGAM inpatient  · I also spoke with her sister in law - Dr Dago Bailey at her request, and she/their family will be able to bring her to the [address] [place] [address] for the procedure   The patient gave me permission to answer any questions Jigar Norris may have via [employer] if they think of anything else            Dr Antwan Vaughan was present for this patient encounter  Total time spent with patient: 61 minutes    Efren Young DO  2/7/2023  4:42 PM    Please note, all notes within the [facility] are written in medical terminology for other doctors to read  If you have a question about something you see in your chart, please don't hesitate to ask about it at your next appointment  All test results are immediately available through [employer] as well, and I will review results at my earliest opportunity and contact you with the appropriate urgency

## 2023-02-07 NOTE — LETTER
2023     Tayler Hoffn, [address] [place] [address]    Patient: Ramesh Dc   YOB: 1995   Date of Visit: 2023       Dear Dr Francis Rasmussen: Thank you for referring Ramesh Dc to me for evaluation  Below are my notes for this consultation  If you have questions, please do not hesitate to call me  I look forward to following your patient along with you  Sincerely,        Renata Benites DO        CC: MD Bernice Harrington MD Dawna Raid, DO  2023  4:43 PM  Sign when Signing Visit  OB/GYN VISIT  Ramesh Dc  2023  4:42 PM    [employer]  #[id number]     Subjective:     Ramesh Dc is a 32 y o   at 19w0d female who presents for counseling on  care  She has mono/di twins and was unfortunately diagnosed with 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  She was seen at both [facility] this week as well as [facility] this morning  She has decided to proceed with termination of pregnancy  She is appropriately upset  I expressed my condolences  History reviewed  No pertinent past medical history  History reviewed  No pertinent surgical history  Objective:    Vitals: Blood pressure 151/90, pulse 97, height 4' 10" (1 473 m), weight 48 1 kg (106 lb), last menstrual period 10/04/2022  Body mass index is 22 15 kg/m²  Physical Exam  Constitutional:       General: She is not in acute distress  HENT:      Head: Normocephalic  Mouth/Throat:      Mouth: Mucous membranes are moist    Cardiovascular:      Rate and Rhythm: Normal rate  Pulmonary:      Effort: No respiratory distress  Abdominal:      General: Abdomen is flat  Palpations: Abdomen is soft  Musculoskeletal:         General: Normal range of motion  Skin:     General: Skin is warm and dry     Neurological:      Mental Status: She is alert and oriented to person, place, and time  Psychiatric:         Behavior: Behavior normal            Assessment/Plan:  Problem List Items Addressed This Visit        Other    Twin to twin transfusion in second trimester - Primary     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  · Patient was counseled in detail about options of expectant management, induction of labor, and D&E  · After discussing through the [address]  and with her sister in law (Dr Tanner Juarez), Virgil Corley opted for a D&E  She would like footprints if it is possible, but understands it may not be possible  · We discussed this procedure in detail, preoperative cervical preparation with laminaria, operative technique, and surgical risks, including but not limited to bleeding, infection, injury to surrounding structures, uterine perforation, intrauterine scarring, and incomplete evacuation  We discussed that while risks of complication are low, the can be severe and may require more extensive surgical intervention (laparoscopy, laparotomy, or hysterectomy)  Reviewed same day surgery, expected recovery course, and follow up  Patient had all questions answered to her satisfaction  Informed consent was obtained  · In accordance with state guidelines, I reviewed with the patient the [facility] 24hr waiting protocol  We discussed her alternatives to D&E as above  She was offered to view the [facility] document on stages of pregnancy and declined; she was counseled that this resource is available online should she prefer to review later  She had no additional questions regarding this information    · She consents to blood transfusion, intraop photos, and CPR if needed  · She will actually come see me in the [facility] office for laminaria placement at 1:30 PM tomorrow (due to covering clinic attending not placing lams) and will be placed on the surgical schedule for her procedure Thursday afternoon  · Will send doxycycline tomorrow at time of laminaria placement  · She will need to sign Hospital Dispo paperwork (>16 weeks) which was not available in the clinic at time of her visit as well as the induced termination of pregnancy form  · She will require RhoGAM inpatient  · I also spoke with her sister in law - Dr Kenton Funes at her request, and she/their family will be able to bring her to the [address] [place] [address] for the procedure  The patient gave me permission to answer any questions Robinson Saldaña may have via [email or web address] if they think of anything else            Dr Tyler Lange was present for this patient encounter  Total time spent with patient: 63 minutes    Villa Cisneros DO  2/7/2023  4:42 PM    Please note, all notes within the [place] [facility] are written in medical terminology for other doctors to read  If you have a question about something you see in your chart, please don't hesitate to ask about it at your next appointment  All test results are immediately available through [employer] as well, and I will review results at my earliest opportunity and contact you with the appropriate urgency         Madelin Palma  2/7/2023  1:47 PM  Sign when Signing Visit  [facility] # [id number]

## 2023-02-07 NOTE — ASSESSMENT & PLAN NOTE
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  · Patient was counseled in detail about options of expectant management, induction of labor, and D&E  · After discussing through the [address]  and with her sister in law (Dr Jaren Machado), Guilherme Garber opted for a D&E  She would like footprints if it is possible, but understands it may not be possible  · We discussed this procedure in detail, preoperative cervical preparation with laminaria, operative technique, and surgical risks, including but not limited to bleeding, infection, injury to surrounding structures, uterine perforation, intrauterine scarring, and incomplete evacuation  We discussed that while risks of complication are low, the can be severe and may require more extensive surgical intervention (laparoscopy, laparotomy, or hysterectomy)  Reviewed same day surgery, expected recovery course, and follow up  Patient had all questions answered to her satisfaction  Informed consent was obtained  · In accordance with state guidelines, I reviewed with the patient the [facility] 24hr waiting protocol  We discussed her alternatives to D&E as above  She was offered to view the [facility] document on stages of pregnancy and declined; she was counseled that this resource is available online should she prefer to review later  She had no additional questions regarding this information  · She consents to blood transfusion, intraop photos, and CPR if needed  · She will actually come see me in the [facility] office for laminaria placement at 1:30 PM tomorrow (due to covering clinic attending not placing lams) and will be placed on the surgical schedule for her procedure Thursday afternoon     · Will send doxycycline tomorrow at time of laminaria placement  · She will need to sign Hospital Dispo paperwork (>16 weeks) which was not available in the clinic at time of her visit as well as the induced termination of pregnancy form  · She will require RhoGAM inpatient  · I also spoke with her sister in law - Dr Vazquez Files at her request, and she/their family will be able to bring her to the [facility] for the procedure   The patient gave me permission to answer any questions Taograham Wilson may have via [employer] if they think of anything else

## 2023-02-07 NOTE — TELEPHONE ENCOUNTER
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

## 2023-02-07 NOTE — TELEPHONE ENCOUNTER
Called the patient via [employer]  to review her interest in termination of pregnancy  Providers able to complete her termination are in the office and available this afternoon  Patient reports that she will present to the [facility] office between 2 and 3 pm for an appointment  All questions answered  Discussed case with Dr Tata Mena and Dr Nayana Rodgers MD  PGY-3 OB/GYN   2/15/2023 2:17 PM

## 2023-02-07 NOTE — H&P (VIEW-ONLY)
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  She was seen at both [facility] this week as well as [facility] this morning  She has decided to proceed with termination of pregnancy  She is appropriately upset  I expressed my condolences  History reviewed  No pertinent past medical history  History reviewed  No pertinent surgical history  Objective:    Vitals: Blood pressure 151/90, pulse 97, height 4' 10" (1 473 m), weight 48 1 kg (106 lb), last menstrual period 10/04/2022  Body mass index is 22 15 kg/m²  Physical Exam  Constitutional:       General: She is not in acute distress  HENT:      Head: Normocephalic  Mouth/Throat:      Mouth: Mucous membranes are moist    Cardiovascular:      Rate and Rhythm: Normal rate  Pulmonary:      Effort: No respiratory distress  Abdominal:      General: Abdomen is flat  Palpations: Abdomen is soft  Musculoskeletal:         General: Normal range of motion  Skin:     General: Skin is warm and dry  Neurological:      Mental Status: She is alert and oriented to person, place, and time  Psychiatric:         Behavior: Behavior normal            Assessment/Plan:  Problem List Items Addressed This Visit        Other    Twin to twin transfusion in second trimester - Primary     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  · Patient was counseled in detail about options of expectant management, induction of labor, and D&E  · After discussing through the [address] [facility] [address]  and with her sister in law (Dr Jessica Panda), Rosalba Phelan opted for a D&E   She would like footprints if it is possible, but understands it may not be possible  · We discussed this procedure in detail, preoperative cervical preparation with laminaria, operative technique, and surgical risks, including but not limited to bleeding, infection, injury to surrounding structures, uterine perforation, intrauterine scarring, and incomplete evacuation  We discussed that while risks of complication are low, the can be severe and may require more extensive surgical intervention (laparoscopy, laparotomy, or hysterectomy)  Reviewed same day surgery, expected recovery course, and follow up  Patient had all questions answered to her satisfaction  Informed consent was obtained  · In accordance with state guidelines, I reviewed with the patient the [facility] 24hr waiting protocol  We discussed her alternatives to D&E as above  She was offered to view the [facility] document on stages of pregnancy and declined; she was counseled that this resource is available online should she prefer to review later  She had no additional questions regarding this information  · She consents to blood transfusion, intraop photos, and CPR if needed  · She will actually come see me in the [address] [place] [address] office for laminaria placement at 1:30 PM tomorrow (due to covering clinic attending not placing lams) and will be placed on the surgical schedule for her procedure Thursday afternoon  · Will send doxycycline tomorrow at time of laminaria placement  · She will need to sign Hospital Dispo paperwork (>16 weeks) which was not available in the clinic at time of her visit as well as the induced termination of pregnancy form  · She will require [facility] inpatient  · I also spoke with her sister in law - Dr Tanner Juarez at her request, and she/their family will be able to bring her to the [facility] for the procedure   The patient gave me permission to answer any questions Joe Menendez may have via [employer] if they think of anything else            Dr Rafy Frank was present for this patient encounter  Total time spent with patient: 61 minutes    Clotilde Sneed DO  2/7/2023  4:42 PM    Please note, all notes within the [facility] are written in medical terminology for other doctors to read  If you have a question about something you see in your chart, please don't hesitate to ask about it at your next appointment  All test results are immediately available through [email or web address] as well, and I will review results at my earliest opportunity and contact you with the appropriate urgency

## 2023-02-08 ENCOUNTER — ANESTHESIA EVENT (OUTPATIENT)
Dept: PERIOP | Facility: HOSPITAL | Age: 28
End: 2023-02-08

## 2023-02-08 ENCOUNTER — PROCEDURE VISIT (OUTPATIENT)
Dept: OBGYN CLINIC | Facility: CLINIC | Age: 28
End: 2023-02-08

## 2023-02-08 VITALS
WEIGHT: 106 LBS | BODY MASS INDEX: 18.78 KG/M2 | SYSTOLIC BLOOD PRESSURE: 140 MMHG | HEIGHT: 63 IN | DIASTOLIC BLOOD PRESSURE: 68 MMHG

## 2023-02-08 DIAGNOSIS — O43.022 TWIN TO TWIN TRANSFUSION IN SECOND TRIMESTER: Primary | ICD-10-CM

## 2023-02-08 NOTE — PROGRESS NOTES
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  Past Medical History:   Diagnosis Date   • Exercise involving walking     2x/week   • Pregnant      Past Surgical History:   Procedure Laterality Date   • NO PAST SURGERIES         Objective:    Vitals: Blood pressure 140/68, height 5' 3" (1 6 m), weight 48 1 kg (106 lb), last menstrual period 10/04/2022  Body mass index is 18 78 kg/m²  Cervical dilation     Date/Time 2023 2:07 PM     Performed by  Rik Ceja DO     Authorized by Rik Ceja DO      Universal Protocol   Procedure performed by: (Dr Gina Rodrigues)  Consent: Verbal consent obtained  Risks and benefits: risks, benefits and alternatives were discussed  Consent given by: patient  Patient understanding: patient states understanding of the procedure being performed  Patient consent: the patient's understanding of the procedure matches consent given  Procedure consent: procedure consent matches procedure scheduled  Patient identity confirmed: verbally with patient        Local anesthesia used: yes     Anesthesia   Local anesthesia used: yes  Local Anesthetic: lidocaine 1% with epinephrine  Anesthetic total: 10 mL     Sedation   Patient sedated: no        Specimen: no    Culture: no   Procedure Details   Procedure Notes: Patient consented for laminaria placement for surgical D&E for 18 week D&E for indication of TTTS  Speculum was placed and 2cc of lidocaine w/ epi placed at 12 o clock site, then paracervical block at 5 and 7 o clock was performed  Tenaculum was applied  1 small laminaria, 2 medium laminaria, and 6 large laminaria were placed without complication  She tolerated very well  2 gauze were placed     Patient tolerance: patient tolerated the procedure well with no immediate complications Assessment/Plan:  Problem List Items Addressed This Visit        Other    Twin to twin transfusion in second trimester - Primary     For D&E on 2/9/23 [facility] main OR w/ Dr Frank Barajas & Dr Saji Hoskins         Relevant Orders    Cervical dilation       D/w Dr Diaz Edmondson, DO  2/8/2023  2:13 PM    Please note, all notes within the [facility] are written in medical terminology for other doctors to read  If you have a question about something you see in your chart, please don't hesitate to ask about it at your next appointment  All test results are immediately available through [employer] as well, and I will review results at my earliest opportunity and contact you with the appropriate urgency

## 2023-02-08 NOTE — PRE-PROCEDURE INSTRUCTIONS
Pre-Surgery Instructions:   Medication Instructions   • folic acid (FOLVITE) 1 mg tablet Hold day of surgery  • Prenatal Vit-Fe Fumarate-FA (prenatal multivitamin) 28-0 8 MG TABS Hold day of surgery  Lindsey-Michael --Reviewed preoperative medication and showering instructions with patient-Pt verbalized understanding  Instructed pt to stop taking ASA /NSAIDS and non prescribed vitamins and herbal supplements from now to surgery or per Dr Shayan Apodaca     May take Tylenol if needed and Instructed to take DOS medications with small sip of water  Instructed NPO past midnight prior to surgery ---surgery department will call the day before surgery with arrival time for DOS  Instructed to notify surgeon and family doctor office with any changes in health prior to surgery  Encouraged healthy hydration /nutrition and rest today and provided emotional support      Patient (Pt ) verbalized understanding of all instructions

## 2023-02-09 ENCOUNTER — ANESTHESIA (OUTPATIENT)
Dept: PERIOP | Facility: HOSPITAL | Age: 28
End: 2023-02-09

## 2023-02-09 ENCOUNTER — HOSPITAL ENCOUNTER (OUTPATIENT)
Facility: HOSPITAL | Age: 28
Setting detail: OUTPATIENT SURGERY
Discharge: HOME | End: 2023-02-09
Attending: OBSTETRICS & GYNECOLOGY | Admitting: OBSTETRICS & GYNECOLOGY

## 2023-02-09 VITALS
RESPIRATION RATE: 18 BRPM | DIASTOLIC BLOOD PRESSURE: 79 MMHG | TEMPERATURE: 99.4 F | HEART RATE: 105 BPM | HEIGHT: 63 IN | WEIGHT: 101.19 LBS | SYSTOLIC BLOOD PRESSURE: 149 MMHG | BODY MASS INDEX: 17.93 KG/M2 | OXYGEN SATURATION: 97 %

## 2023-02-09 DIAGNOSIS — Z33.2 ENCOUNTER FOR ELECTIVE TERMINATION OF PREGNANCY: ICD-10-CM

## 2023-02-09 DIAGNOSIS — Z98.890 STATUS POST D&C: Primary | ICD-10-CM

## 2023-02-09 DIAGNOSIS — O43.022 TWIN TO TWIN TRANSFUSION IN SECOND TRIMESTER: ICD-10-CM

## 2023-02-09 LAB
ALBUMIN SERPL BCP-MCNC: 3 G/DL (ref 3.5–5)
ALP SERPL-CCNC: 74 U/L (ref 34–104)
ALT SERPL W P-5'-P-CCNC: 35 U/L (ref 7–52)
ANION GAP SERPL CALCULATED.3IONS-SCNC: 12 MMOL/L (ref 4–13)
AST SERPL W P-5'-P-CCNC: 33 U/L (ref 13–39)
BACTERIA UR QL AUTO: ABNORMAL /HPF
BILIRUB SERPL-MCNC: 0.36 MG/DL (ref 0.2–1)
BILIRUB UR QL STRIP: NEGATIVE
BUN SERPL-MCNC: 8 MG/DL (ref 5–25)
CALCIUM ALBUM COR SERPL-MCNC: 9 MG/DL (ref 8.3–10.1)
CALCIUM SERPL-MCNC: 8.2 MG/DL (ref 8.4–10.2)
CHLORIDE SERPL-SCNC: 105 MMOL/L (ref 96–108)
CLARITY UR: CLEAR
CO2 SERPL-SCNC: 18 MMOL/L (ref 21–32)
COLOR UR: YELLOW
CREAT SERPL-MCNC: 0.57 MG/DL (ref 0.6–1.3)
CREAT UR-MCNC: 133.1 MG/DL
CREAT UR-MCNC: 133.5 MG/DL
ERYTHROCYTE [DISTWIDTH] IN BLOOD BY AUTOMATED COUNT: 15 % (ref 11.6–15.1)
GFR SERPL CREATININE-BSD FRML MDRD: 125 ML/MIN/1.73SQ M
GLUCOSE P FAST SERPL-MCNC: 88 MG/DL (ref 65–99)
GLUCOSE SERPL-MCNC: 88 MG/DL (ref 65–140)
GLUCOSE UR STRIP-MCNC: NEGATIVE MG/DL
HCT VFR BLD AUTO: 26.7 % (ref 34.8–46.1)
HGB BLD-MCNC: 8.8 G/DL (ref 11.5–15.4)
HGB UR QL STRIP.AUTO: ABNORMAL
KETONES UR STRIP-MCNC: ABNORMAL MG/DL
LEUKOCYTE ESTERASE UR QL STRIP: NEGATIVE
MCH RBC QN AUTO: 24.7 PG (ref 26.8–34.3)
MCHC RBC AUTO-ENTMCNC: 33 G/DL (ref 31.4–37.4)
MCV RBC AUTO: 75 FL (ref 82–98)
MUCOUS THREADS UR QL AUTO: ABNORMAL
NITRITE UR QL STRIP: NEGATIVE
NON-SQ EPI CELLS URNS QL MICRO: ABNORMAL /HPF
PH UR STRIP.AUTO: 6.5 [PH]
PLATELET # BLD AUTO: 218 THOUSANDS/UL (ref 149–390)
PMV BLD AUTO: 10.6 FL (ref 8.9–12.7)
POTASSIUM SERPL-SCNC: 4.3 MMOL/L (ref 3.5–5.3)
PROT SERPL-MCNC: 5.9 G/DL (ref 6.4–8.4)
PROT UR STRIP-MCNC: ABNORMAL MG/DL
PROT UR-MCNC: 1436 MG/DL
PROT UR-MCNC: 1441 MG/DL
PROT/CREAT UR: 10.76 MG/G{CREAT} (ref 0–0.1)
PROT/CREAT UR: 10.83 MG/G{CREAT} (ref 0–0.1)
RBC # BLD AUTO: 3.56 MILLION/UL (ref 3.81–5.12)
RBC #/AREA URNS AUTO: ABNORMAL /HPF
SODIUM SERPL-SCNC: 135 MMOL/L (ref 135–147)
SP GR UR STRIP.AUTO: 1.02 (ref 1–1.03)
UROBILINOGEN UR STRIP-ACNC: <2 MG/DL
WBC # BLD AUTO: 14.77 THOUSAND/UL (ref 4.31–10.16)
WBC #/AREA URNS AUTO: ABNORMAL /HPF

## 2023-02-09 RX ORDER — FENTANYL CITRATE 50 UG/ML
INJECTION, SOLUTION INTRAMUSCULAR; INTRAVENOUS AS NEEDED
Status: DISCONTINUED | OUTPATIENT
Start: 2023-02-09 | End: 2023-02-09

## 2023-02-09 RX ORDER — DOXYCYCLINE 100 MG/10ML
200 INJECTION, POWDER, LYOPHILIZED, FOR SOLUTION INTRAVENOUS ONCE
Status: DISCONTINUED | OUTPATIENT
Start: 2023-02-09 | End: 2023-02-09

## 2023-02-09 RX ORDER — MISOPROSTOL 200 UG/1
800 TABLET ORAL ONCE
Status: COMPLETED | OUTPATIENT
Start: 2023-02-09 | End: 2023-02-09

## 2023-02-09 RX ORDER — SUCCINYLCHOLINE/SOD CL,ISO/PF 100 MG/5ML
SYRINGE (ML) INTRAVENOUS AS NEEDED
Status: DISCONTINUED | OUTPATIENT
Start: 2023-02-09 | End: 2023-02-09

## 2023-02-09 RX ORDER — LABETALOL HYDROCHLORIDE 5 MG/ML
10 INJECTION, SOLUTION INTRAVENOUS ONCE
Status: COMPLETED | OUTPATIENT
Start: 2023-02-09 | End: 2023-02-09

## 2023-02-09 RX ORDER — ACETAMINOPHEN 325 MG/1
975 TABLET ORAL EVERY 6 HOURS PRN
Status: DISCONTINUED | OUTPATIENT
Start: 2023-02-09 | End: 2023-02-09 | Stop reason: HOSPADM

## 2023-02-09 RX ORDER — IBUPROFEN 600 MG/1
600 TABLET ORAL EVERY 6 HOURS PRN
Status: DISCONTINUED | OUTPATIENT
Start: 2023-02-09 | End: 2023-02-09 | Stop reason: HOSPADM

## 2023-02-09 RX ORDER — KETOROLAC TROMETHAMINE 30 MG/ML
INJECTION, SOLUTION INTRAMUSCULAR; INTRAVENOUS AS NEEDED
Status: DISCONTINUED | OUTPATIENT
Start: 2023-02-09 | End: 2023-02-09

## 2023-02-09 RX ORDER — LIDOCAINE HYDROCHLORIDE 10 MG/ML
INJECTION, SOLUTION EPIDURAL; INFILTRATION; INTRACAUDAL; PERINEURAL AS NEEDED
Status: DISCONTINUED | OUTPATIENT
Start: 2023-02-09 | End: 2023-02-09

## 2023-02-09 RX ORDER — DEXAMETHASONE SODIUM PHOSPHATE 10 MG/ML
INJECTION, SOLUTION INTRAMUSCULAR; INTRAVENOUS AS NEEDED
Status: DISCONTINUED | OUTPATIENT
Start: 2023-02-09 | End: 2023-02-09

## 2023-02-09 RX ORDER — ROCURONIUM BROMIDE 10 MG/ML
INJECTION, SOLUTION INTRAVENOUS AS NEEDED
Status: DISCONTINUED | OUTPATIENT
Start: 2023-02-09 | End: 2023-02-09

## 2023-02-09 RX ORDER — LOPERAMIDE HYDROCHLORIDE 2 MG/1
2 CAPSULE ORAL ONCE
Status: CANCELLED | OUTPATIENT
Start: 2023-02-09

## 2023-02-09 RX ORDER — ONDANSETRON 2 MG/ML
INJECTION INTRAMUSCULAR; INTRAVENOUS AS NEEDED
Status: DISCONTINUED | OUTPATIENT
Start: 2023-02-09 | End: 2023-02-09

## 2023-02-09 RX ORDER — SODIUM CHLORIDE, SODIUM LACTATE, POTASSIUM CHLORIDE, CALCIUM CHLORIDE 600; 310; 30; 20 MG/100ML; MG/100ML; MG/100ML; MG/100ML
INJECTION, SOLUTION INTRAVENOUS CONTINUOUS PRN
Status: DISCONTINUED | OUTPATIENT
Start: 2023-02-09 | End: 2023-02-09

## 2023-02-09 RX ORDER — MAGNESIUM HYDROXIDE 1200 MG/15ML
LIQUID ORAL AS NEEDED
Status: DISCONTINUED | OUTPATIENT
Start: 2023-02-09 | End: 2023-02-09 | Stop reason: HOSPADM

## 2023-02-09 RX ORDER — MIDAZOLAM HYDROCHLORIDE 2 MG/2ML
INJECTION, SOLUTION INTRAMUSCULAR; INTRAVENOUS AS NEEDED
Status: DISCONTINUED | OUTPATIENT
Start: 2023-02-09 | End: 2023-02-09

## 2023-02-09 RX ORDER — ONDANSETRON 2 MG/ML
4 INJECTION INTRAMUSCULAR; INTRAVENOUS EVERY 6 HOURS PRN
Status: DISCONTINUED | OUTPATIENT
Start: 2023-02-09 | End: 2023-02-09 | Stop reason: HOSPADM

## 2023-02-09 RX ORDER — PROPOFOL 10 MG/ML
INJECTION, EMULSION INTRAVENOUS AS NEEDED
Status: DISCONTINUED | OUTPATIENT
Start: 2023-02-09 | End: 2023-02-09

## 2023-02-09 RX ORDER — IBUPROFEN 600 MG/1
600 TABLET ORAL EVERY 6 HOURS PRN
Qty: 30 TABLET | Refills: 0
Start: 2023-02-09

## 2023-02-09 RX ORDER — LIDOCAINE HYDROCHLORIDE AND EPINEPHRINE 10; 10 MG/ML; UG/ML
INJECTION, SOLUTION INFILTRATION; PERINEURAL AS NEEDED
Status: DISCONTINUED | OUTPATIENT
Start: 2023-02-09 | End: 2023-02-09 | Stop reason: HOSPADM

## 2023-02-09 RX ORDER — DOXYCYCLINE HYCLATE 100 MG/1
200 CAPSULE ORAL ONCE
Status: COMPLETED | OUTPATIENT
Start: 2023-02-09 | End: 2023-02-09

## 2023-02-09 RX ORDER — HYDROMORPHONE HCL/PF 1 MG/ML
SYRINGE (ML) INJECTION AS NEEDED
Status: DISCONTINUED | OUTPATIENT
Start: 2023-02-09 | End: 2023-02-09

## 2023-02-09 RX ADMIN — SODIUM CHLORIDE, SODIUM LACTATE, POTASSIUM CHLORIDE, AND CALCIUM CHLORIDE: .6; .31; .03; .02 INJECTION, SOLUTION INTRAVENOUS at 12:56

## 2023-02-09 RX ADMIN — SODIUM CHLORIDE, SODIUM LACTATE, POTASSIUM CHLORIDE, AND CALCIUM CHLORIDE: .6; .31; .03; .02 INJECTION, SOLUTION INTRAVENOUS at 14:54

## 2023-02-09 RX ADMIN — SUGAMMADEX 100 MG: 100 INJECTION, SOLUTION INTRAVENOUS at 14:55

## 2023-02-09 RX ADMIN — ACETAMINOPHEN 975 MG: 325 TABLET ORAL at 17:23

## 2023-02-09 RX ADMIN — IRON SUCROSE 200 MG: 20 INJECTION, SOLUTION INTRAVENOUS at 16:48

## 2023-02-09 RX ADMIN — Medication 100 MG: at 14:04

## 2023-02-09 RX ADMIN — MISOPROSTOL 800 MCG: 200 TABLET ORAL at 12:50

## 2023-02-09 RX ADMIN — ONDANSETRON 4 MG: 2 INJECTION INTRAMUSCULAR; INTRAVENOUS at 13:50

## 2023-02-09 RX ADMIN — SODIUM CHLORIDE, SODIUM LACTATE, POTASSIUM CHLORIDE, AND CALCIUM CHLORIDE: .6; .31; .03; .02 INJECTION, SOLUTION INTRAVENOUS at 14:06

## 2023-02-09 RX ADMIN — DEXAMETHASONE SODIUM PHOSPHATE 8 MG: 10 INJECTION, SOLUTION INTRAMUSCULAR; INTRAVENOUS at 14:19

## 2023-02-09 RX ADMIN — PROPOFOL 150 MG: 10 INJECTION, EMULSION INTRAVENOUS at 14:04

## 2023-02-09 RX ADMIN — MIDAZOLAM HYDROCHLORIDE 2 MG: 1 INJECTION, SOLUTION INTRAMUSCULAR; INTRAVENOUS at 13:57

## 2023-02-09 RX ADMIN — HYDROMORPHONE HYDROCHLORIDE 0.25 MG: 1 INJECTION, SOLUTION INTRAMUSCULAR; INTRAVENOUS; SUBCUTANEOUS at 15:04

## 2023-02-09 RX ADMIN — LIDOCAINE HYDROCHLORIDE 50 MG: 10 INJECTION, SOLUTION EPIDURAL; INFILTRATION; INTRACAUDAL at 14:04

## 2023-02-09 RX ADMIN — ROCURONIUM BROMIDE 25 MG: 10 SOLUTION INTRAVENOUS at 14:18

## 2023-02-09 RX ADMIN — DOXYCYCLINE 100 MG: 100 INJECTION, POWDER, LYOPHILIZED, FOR SOLUTION INTRAVENOUS at 15:00

## 2023-02-09 RX ADMIN — LABETALOL HYDROCHLORIDE 10 MG: 5 INJECTION, SOLUTION INTRAVENOUS at 15:38

## 2023-02-09 RX ADMIN — DOXYCYCLINE 200 MG: 100 CAPSULE ORAL at 13:24

## 2023-02-09 RX ADMIN — FENTANYL CITRATE 50 MCG: 50 INJECTION, SOLUTION INTRAMUSCULAR; INTRAVENOUS at 14:18

## 2023-02-09 RX ADMIN — TRANEXAMIC ACID 1000 MG: 1 INJECTION, SOLUTION INTRAVENOUS at 14:55

## 2023-02-09 RX ADMIN — PROPOFOL 50 MG: 10 INJECTION, EMULSION INTRAVENOUS at 14:55

## 2023-02-09 RX ADMIN — KETOROLAC TROMETHAMINE 15 MG: 30 INJECTION, SOLUTION INTRAMUSCULAR at 14:43

## 2023-02-09 RX ADMIN — FENTANYL CITRATE 50 MCG: 50 INJECTION, SOLUTION INTRAMUSCULAR; INTRAVENOUS at 14:04

## 2023-02-09 NOTE — ANESTHESIA PREPROCEDURE EVALUATION
Procedure:  DILATATION AND EVACUATION (D&E) (18 WEEKS) (Uterus)    Relevant Problems   GYN   (+) 18 weeks gestation of pregnancy        Physical Exam    Airway       Dental   Comment: None loose, No notable dental hx     Cardiovascular      Pulmonary      Other Findings        Anesthesia Plan  ASA Score- 2     Anesthesia Type- general with ASA Monitors  Additional Monitors:   Airway Plan: ETT  Comment: NPO after MN    RSI despite NPO status due to stage of pregnancy  Plan Factors-Exercise tolerance (METS): >4 METS  Chart reviewed  Patient summary reviewed  Patient is not a current smoker  Induction- intravenous and rapid sequence induction  Postoperative Plan- Plan for postoperative opioid use  Planned trial extubation    Informed Consent- Anesthetic plan and risks discussed with patient ( [facility] [id number])  I personally reviewed this patient with the CRNA  Discussed and agreed on the Anesthesia Plan with the CRNA  Manny Ruvalcaba

## 2023-02-09 NOTE — INTERVAL H&P NOTE
H&P reviewed  After examining the patient I find no changes in the patients condition since the H&P had been written  She had some mild cramping/bleeding w/ laminaria  To OR for D&E for Stage IV TTTS, 18 weeks  Doxycycline 200mg for abx ppx  Heart and lung exam wnl  Hospital disposition  RTO in 2 weeks for post op visit w/ family planning resident       Vitals:    02/09/23 1235   BP: 156/85   Pulse: 95   Resp: 18   Temp: 98 8 °F (37 1 °C)   SpO2: 97%

## 2023-02-09 NOTE — ANESTHESIA PREPROCEDURE EVALUATION
Procedure:  DILATATION AND EVACUATION (D&E) (18 WEEKS) (Uterus)    Relevant Problems   GYN   (+) 18 weeks gestation of pregnancy

## 2023-02-09 NOTE — ANESTHESIA POSTPROCEDURE EVALUATION
Post-Op Assessment Note    CV Status:  Stable  Pain Score: 0    Pain management: adequate     Mental Status:  Sleepy and arousable   Hydration Status:  Euvolemic   PONV Controlled:  Controlled   Airway Patency:  Patent      Post Op Vitals Reviewed: Yes      Staff: CRNA         No notable events documented      BP   155/97   Temp   99 8   Pulse  144   Resp   40   SpO2   100% 4L

## 2023-02-09 NOTE — PROGRESS NOTES
Tianna Hawkins has had mild range Bps (140s-150s/70-80s) since diagnosis of her TTTS  This was initially attributed to anxiety, but given multiple gestation, CBC, CMP, P:Cr were collected  UPC resulted as 10 83  This will be re-run to verify  We discussed potential for mirror syndrome, and she was offered observation overnight for elevated Bps and repeat labs in the morning, however she prefers to go home  We discussed following up in the office for a blood pressure check and outpatient labs in 1 week, and she was given return precautions       D/w Dr Renuka Harper and Dr Val Ramos

## 2023-02-09 NOTE — DISCHARGE INSTRUCTIONS
Post-Gynecologic Surgery Discharge Instructions:  1  No heavy lifting more than one full gallon of milk (about 8 lbs) for 1 week  2  Nothing in the vagina for 6 weeks, or until cleared at your post-operative visit  3  You may take stairs one at a time, touching each step with both feet for the first few days, then as tolerated  4  Call the office for fever greater than 100  4'F, heavy vaginal bleeding, or increasing pain  5  Activity as tolerated  6  Avoid driving if taking narcotic pain medications (Roxicodone, Percocet, Vicodin)  Post Operative Pain Management:  For pain, take 650 mg of tylenol every 6 hours  For cramping, take 600 mg Ibuprofen every 6 hours to relieve  You may alternate these and take them "around the clock" to stay ahead of pain  For example, take 650mg of tylenol at 9 AM, then 600mg of ibuprofen at 12 PM, then 650 of tylenol at 3 PM, and so forth  If you have any questions regarding your prescriptions please call your doctor        ?????-????????????? ?????? ????????? ???????:  1  1 ?????? ?? ??? ?? ?? ???? ????? ???? ??? (???? 8 ?????) ???? ??????  2  ???? ??? 6 ?????? ?? ??? ?? ????, ?? ?? ?? ?? ???? ?????-??????? ?????? ?? ??? ???? ?? ????  3  ?? ??-?? ???? ???????? ??? ???? ???, ???? ??? ????? ?? ??? ????? ????? ?? ???????? ??? ?? ?? ???? ???, ??? ??? ???? ?? ???? ???  4  100 4 '?? ?? ???? ?????, ???? ???? ?????????, ?? ????? ???? ?? ??? ???????? ?? ??? ?????  5  ??????? ?? ??? ???? ???? ???  6  ??? ????????? ????    ????? ??????? ???? ???????:  ???? ?? ???, ?? 6 ???? ??? 650 ????????? ???????? ????   ???? ?? ???, ???? ???? ?? ??? ?? 6 ???? ??? 600 ????????? ?????????? ????     ?? ?????? ???????? ?? ???? ??? ?? ???? ?? ??? ???? ?? ??? ?????? "??????? ????" ?? ???? ???? ?????? ?? ???, ???? 9 ??? 650 ????????? ???????? ???, ??? 12 ??? 600 ????????? ?????????? ???, ??? 3 ??? ???????? ?? 650, ?? ??? ????     ??? ???? ??? ???? ?????? ?? ???? ??? ??? ?????? ??? ?? ????? ???? ?????? ?? ???????    post operative alvino cervantes:  dard ke liye, ranjan 6 ghante mein 650 milligram tylenol cali  ainthan ke liye, jasmina dene ke liye ranjan 6 ghante mein 600 milligram ibuprofen cali    aap inhen vaikalpik eh sakte jim aur dard se aage rahane ke lie unhen "chaubison ghnte" le [facility] ke lie, subah 9 baje 650 milligram tylenol lein, fir 12 baje 600 milligram ibuprofen lein, fir 3 baje tylenol ke 650, aur guido tarha    adelfo aapke pass apne nuskhe ke baare mein koi prashn jim to [facility] doctor aleyda maradiaga

## 2023-02-09 NOTE — OP NOTE
OPERATIVE REPORT  PATIENT NAME: Tanna Rosado    :  1995  MRN: [id number]  Pt Location: [facility] OR ROOM 01    SURGERY DATE: 2023    Surgeon(s) and Role:     * Jose De Jesus Bolivar DO - Primary     * Jayme Caro DO - Assisting    Preop Diagnosis:  Encounter for elective termination of pregnancy [Z33 2]    Post-Op Diagnosis Codes:     * Encounter for elective termination of pregnancy [Z33 2]    Procedure(s):  DILATATION AND EVACUATION (D&E) (18 WEEKS)    Specimen(s):  ID Type Source Tests Collected by Time Destination   1 : products of conception Tissue Products of Conception TISSUE EXAM Jose De Jesus Bolivar DO 2023 1429        Estimated Blood Loss:   500 mL    Drains: None    Anesthesia Type:   General    Operative Indications:  Tanna Rosado is a 33 yo  with 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  After counseling on her options for the pregnancy, she elected to terminate the pregnancy with a D&E  She underwent laminaria placement on 23 and received 400ug misoprostol 2 hrs preoperatively for cervical ripening  Operative Findings:  1  Gauze was not found in vagina  The 9 laminaria were removed without complication  2  Cervix dilated 2 5 cm   3  Thin endometrial stripe at conclusion of procedure   4  Small cervical abrasion requiring figure of 8 suture, hemostatic  5  Patient noted to be tachycardic and with fever of 101 *F prior to procedure likely due to misoprostol administration  6  TXA given at conclusion of procedure    Complications:   None    Procedure and Technique:    Description of Procedure  Patient was taken to the operating room where a time out was performed to confirm correct patient and correct procedure  General endotracheal anesthesia (GET) was administered and the patient was positioned on the OR table in the dorsal lithotomy position   All pressure points were padded and a joelle hugger was placed to maintain control of core body temperature  A bimanual exam was performed and the uterus was noted to be 22 weeks in size and consistency with no palpable adnexal masses or fullness  The patient was prepped and draped in the usual sterile fashion  A straight catheter was introduced into the bladder, which was drained of 100 cc of clear yellow urine  This was sent for UP:Cr due to her mild range blood pressures in the clinic preoperatively  A weighted speculum was inserted into the vagina and a lucinda was used to visualize the anterior lip of the cervix, which was then grasped with a single toothed tenaculum  The cervix was already dilated and the 16 curved suction curette was introduced for amniotomy  A large amount of amniotic fluid was released  Under ultrasound guidance, sopher and kenneth forceps were used to evacuate the products of conception  Next, the suction curette was advanced to the uterine fundus and then suction applied and the curette was rotated in a circular fashion as the curette was withdrawn  The uterus was noted to clamp down and there was a thin endometrial stripe noted on the ultrasound  The single toothed tenaculum was removed from the anterior lip of the cervix  There was a small abrasion on the 6 o'clock portion of the cervix, and 2-0 vicryl was used to achieve hemostasis  TXA was given by CRNA  Good hemostasis was confirmed at the tenaculum puncture sites  Weighted speculum was then removed from the vagina  At the conclusion of the procedure, all needle, sponge, and instrument counts were noted to be correct x2  Patient tolerated the procedure well and was transferred to PACU in stable condition prior to discharge with follow up in 1-2 weeks  Dr Lanny Dooley was present and participated in all key portions of the case        Patient Disposition:  PACU       SIGNATURE: Ramirez Knight DO  DATE: February 9, 2023  TIME: 3:49 PM

## 2023-02-10 LAB — BACTERIA UR CULT: NORMAL

## 2023-02-15 ENCOUNTER — OFFICE VISIT (OUTPATIENT)
Dept: OBGYN CLINIC | Facility: CLINIC | Age: 28
End: 2023-02-15

## 2023-02-15 VITALS
SYSTOLIC BLOOD PRESSURE: 168 MMHG | WEIGHT: 94.2 LBS | DIASTOLIC BLOOD PRESSURE: 80 MMHG | HEART RATE: 102 BPM | HEIGHT: 63 IN | BODY MASS INDEX: 16.69 KG/M2

## 2023-02-15 DIAGNOSIS — M79.18 INTERCOSTAL MUSCLE PAIN: Primary | ICD-10-CM

## 2023-02-15 RX ORDER — CYCLOBENZAPRINE HCL 5 MG
5 TABLET ORAL 3 TIMES DAILY PRN
Qty: 20 TABLET | Refills: 0 | Status: SHIPPED | OUTPATIENT
Start: 2023-02-15

## 2023-02-15 NOTE — PROGRESS NOTES
Postoperative evaluation  Maggie Washington 32 y o  female MRN: [id number]  Unit/Bed#:  Encounter: [id number]     [facility] THIS ENCOUNTER    Subjective     Maggie Washington is a 32 y o  y o  female  who presents for post operatory evaluation after elective D&E in setting of mono-di twin pregnancy complicated by stage IV twin-twin transfusion and 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  Procedure was performed at 19 weeks 1 day  Procedure was uncomplicated  Postprocedure time patient with blood pressures in the 140s to 150s over 70s to 80s initially attributed to anxiety  CBC CMP within normal limits and protein creatinine ratio was collected result of 10 76 (no evidence of UTI on UA but blood present), creatinine 0 57, AST ALT within normal limits  Hemoglobin 8 8 and platelets 552  Patient present today for blood pressure check  Patient denies headache, change in her vision or right upper quadrant pain  Today patient states pain in her breast related with engorgement and mild colostrum secretion  Patient states she has been doing breast massages for pain  Denies fever or erythema on her breast, denies masses  Bleeding thin lochia  Bowel function is normal  Bladder function is normal  Patient is not sexually active  Contraception method is none  Postpartum depression screening: positive  The following portions of the patient's history were reviewed and updated as appropriate: allergies and problem list     Review of Systems  Pertinent items are noted in HPI       Objective     /64   Wt 59 4 kg (131 lb)   BMI 22 49 kg/m²    General:   appears stated age, cooperative, alert normal mood and affect   Neck: Neck: normal, supple,trachea midline, no masses   Heart: regular rate and rhythm, S1, S2 normal, no murmur, click, rub or gallop   Lungs: clear to auscultation bilaterally   Breasts: Enlarged, engorged bilateral breast, no erythema, no masses  No nipple discharge present with gentle nipple pressure   Abdomen: soft, non-tender, without masses or organomegaly   Vulva: normal   Vagina: not evaluated                     Assessment/Plan       Status post second semester termination of twin pregnancy  Patient in late postoperative time period, after D&E in setting of mono-di twin pregnancy at 19 weeks and 1 day, complicated by stage IV twin-twin transfusion and 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  Postprocedure patient with elevated blood pressures in the 140s to 150s over 70s to 80s CBC and CMP within normal limits but with elevated protein creatinine ratio (possible bias result in setting of hematuria) sent in today for follow-up of blood pressures    · Patient is tearfully today about baby situation, she denies SI/HI  · Normotensive today, PE symptoms  · Breast engorgement present, I have discussed with patient avoid breast manipulation  Use of sport bra and ice has been encouraged  · Minute minimal lochia, no foul smelling  · Contraception:  I have asked to patient about desire for contraception, she declined at this time  She is not looking for further pregnancy at this time neither  · Follow-up in annual examination or before if patient desired previous examination      Patient D/W Rafy Marrufo MD

## 2023-02-21 ENCOUNTER — OFFICE VISIT (OUTPATIENT)
Dept: OBGYN CLINIC | Facility: CLINIC | Age: 28
End: 2023-02-21

## 2023-02-21 VITALS
HEART RATE: 112 BPM | SYSTOLIC BLOOD PRESSURE: 137 MMHG | BODY MASS INDEX: 16.66 KG/M2 | WEIGHT: 94 LBS | HEIGHT: 63 IN | DIASTOLIC BLOOD PRESSURE: 82 MMHG

## 2023-02-21 DIAGNOSIS — O43.022 TWIN TO TWIN TRANSFUSION IN SECOND TRIMESTER: Primary | ICD-10-CM

## 2023-02-21 PROBLEM — O21.9 NAUSEA AND VOMITING DURING PREGNANCY: Status: RESOLVED | Noted: 2022-12-08 | Resolved: 2023-02-21

## 2023-02-21 PROBLEM — F80.9 COMMUNICATION PROBLEM: Status: RESOLVED | Noted: 2023-02-02 | Resolved: 2023-02-21

## 2023-02-21 NOTE — PROGRESS NOTES
OB/GYN VISIT  Matilda Lopez  2023  1:47 PM    [employer]  #[id number] used for translation    Assessment/Plan:    Problem List Items Addressed This Visit        Other    Twin to twin transfusion in second trimester - Primary     S/p D&E - doing well  BP /82 - recommend complete labs as ordered - CBC/CMP  Discussed planning for future pregnancies  Recommend waiting to conceive until after menses returns  Patient planning on waiting until her partner has returned from [facility] in 3 weeks  Declines contraception  Plan to f/u for annual exam              Subjective:     Matilda Lopez is a 32 y o   female who presents for follow up after D&E at 19 weeks on 23 in setting of mono-di twin pregnancy complicated by stage IV twin-twin transfusion and 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  Patient has been feeling better since initial postoperative visit  She no longer has breast engorgement  Pain is currently controlled  She is tolerating PO with regular bladder and bowel function  She has had some light bleeding/spotting  Denies headaches, vision changes, dizziness, chest pain, shortness of breath, lower extremity pain/tenderness  She is not currently using contraception, but her partner is currently in [facility] and will not be in the [address] [place] [address] for another 3 months  Final pathology consistent with TTS, no evidence of chorioamnionitis or molar pregnancy  Objective:    Vitals: Blood pressure 137/82, pulse (!) 112, height 5' 3" (1 6 m), weight 42 6 kg (94 lb), last menstrual period 10/04/2022, not currently breastfeeding  Body mass index is 16 65 kg/m²      Past Medical History:   Diagnosis Date   • Communication problem 2023   • Exercise involving walking     2x/week   • Pregnant      Past Surgical History:   Procedure Laterality Date   • NO PAST SURGERIES     • [place] INDUCED  DILATION & EVACUATION N/A 2023    Procedure: DILATATION AND EVACUATION (D&E) (18 WEEKS); Surgeon: Amaury Hernandez DO;  Location: [facility];  Service: Gynecology       Physical Exam  Vitals reviewed  Exam conducted with a chaperone present  Constitutional:       General: She is not in acute distress  Appearance: Normal appearance  She is well-developed and normal weight  She is not ill-appearing or toxic-appearing  HENT:      Head: Normocephalic and atraumatic  Cardiovascular:      Rate and Rhythm: Normal rate  Pulmonary:      Effort: Pulmonary effort is normal  No respiratory distress  Abdominal:      General: There is no distension  Palpations: Abdomen is soft  There is no mass  Tenderness: There is no abdominal tenderness  There is no guarding or rebound  Skin:     General: Skin is warm and dry  Neurological:      General: No focal deficit present  Mental Status: She is alert and oriented to person, place, and time     Psychiatric:         Mood and Affect: Mood normal          Behavior: Behavior normal            Versa Mortimer, MD  2/21/2023  1:47 PM

## 2023-02-21 NOTE — ASSESSMENT & PLAN NOTE
S/p D&E - doing well  BP /82 - recommend complete labs as ordered - CBC/CMP  Discussed planning for future pregnancies  Recommend waiting to conceive until after menses returns  Patient planning on waiting until her partner has returned from [facility] in 3 weeks  Declines contraception     Plan to f/u for annual exam

## 2023-02-25 ENCOUNTER — APPOINTMENT (OUTPATIENT)
Dept: LAB | Facility: CLINIC | Age: 28
End: 2023-02-25

## 2023-02-25 DIAGNOSIS — O43.022 TWIN TO TWIN TRANSFUSION IN SECOND TRIMESTER: ICD-10-CM

## 2023-02-25 DIAGNOSIS — Z98.890 STATUS POST D&C: ICD-10-CM

## 2023-02-25 LAB
ALBUMIN SERPL BCP-MCNC: 4 G/DL (ref 3.5–5)
ALP SERPL-CCNC: 71 U/L (ref 34–104)
ALT SERPL W P-5'-P-CCNC: 17 U/L (ref 7–52)
ANION GAP SERPL CALCULATED.3IONS-SCNC: 8 MMOL/L (ref 4–13)
AST SERPL W P-5'-P-CCNC: 15 U/L (ref 13–39)
BILIRUB SERPL-MCNC: 0.45 MG/DL (ref 0.2–1)
BUN SERPL-MCNC: 10 MG/DL (ref 5–25)
CALCIUM SERPL-MCNC: 9 MG/DL (ref 8.4–10.2)
CHLORIDE SERPL-SCNC: 106 MMOL/L (ref 96–108)
CO2 SERPL-SCNC: 24 MMOL/L (ref 21–32)
CREAT SERPL-MCNC: 0.43 MG/DL (ref 0.6–1.3)
ERYTHROCYTE [DISTWIDTH] IN BLOOD BY AUTOMATED COUNT: 18.1 % (ref 11.6–15.1)
GFR SERPL CREATININE-BSD FRML MDRD: 137 ML/MIN/1.73SQ M
GLUCOSE P FAST SERPL-MCNC: 86 MG/DL (ref 65–99)
HCT VFR BLD AUTO: 32.1 % (ref 34.8–46.1)
HGB BLD-MCNC: 9.8 G/DL (ref 11.5–15.4)
MCH RBC QN AUTO: 24.5 PG (ref 26.8–34.3)
MCHC RBC AUTO-ENTMCNC: 30.5 G/DL (ref 31.4–37.4)
MCV RBC AUTO: 80 FL (ref 82–98)
PLATELET # BLD AUTO: 420 THOUSANDS/UL (ref 149–390)
PMV BLD AUTO: 10.5 FL (ref 8.9–12.7)
POTASSIUM SERPL-SCNC: 3.8 MMOL/L (ref 3.5–5.3)
PROT SERPL-MCNC: 7.5 G/DL (ref 6.4–8.4)
RBC # BLD AUTO: 4 MILLION/UL (ref 3.81–5.12)
SODIUM SERPL-SCNC: 138 MMOL/L (ref 135–147)
WBC # BLD AUTO: 5.11 THOUSAND/UL (ref 4.31–10.16)

## 2023-03-11 PROBLEM — B96.29 UTI DUE TO EXTENDED-SPECTRUM BETA LACTAMASE (ESBL) PRODUCING ESCHERICHIA COLI: Status: RESOLVED | Noted: 2023-01-10 | Resolved: 2023-03-11

## 2023-03-11 PROBLEM — N39.0 UTI DUE TO EXTENDED-SPECTRUM BETA LACTAMASE (ESBL) PRODUCING ESCHERICHIA COLI: Status: RESOLVED | Noted: 2023-01-10 | Resolved: 2023-03-11

## 2023-03-11 PROBLEM — Z16.12 UTI DUE TO EXTENDED-SPECTRUM BETA LACTAMASE (ESBL) PRODUCING ESCHERICHIA COLI: Status: RESOLVED | Noted: 2023-01-10 | Resolved: 2023-03-11

## 2023-05-15 ENCOUNTER — ANNUAL EXAM (OUTPATIENT)
Dept: OBGYN CLINIC | Facility: CLINIC | Age: 28
End: 2023-05-15

## 2023-05-15 VITALS
HEART RATE: 142 BPM | WEIGHT: 95.8 LBS | HEIGHT: 63 IN | SYSTOLIC BLOOD PRESSURE: 92 MMHG | BODY MASS INDEX: 16.97 KG/M2 | DIASTOLIC BLOOD PRESSURE: 67 MMHG | RESPIRATION RATE: 18 BRPM

## 2023-05-15 DIAGNOSIS — N89.8 VAGINAL DISCHARGE: ICD-10-CM

## 2023-05-15 DIAGNOSIS — B37.31 VULVOVAGINAL CANDIDIASIS: Primary | ICD-10-CM

## 2023-05-15 PROBLEM — O99.891 BACTERIURIA DURING PREGNANCY: Status: RESOLVED | Noted: 2022-12-29 | Resolved: 2023-05-15

## 2023-05-15 PROBLEM — Z3A.18 18 WEEKS GESTATION OF PREGNANCY: Status: RESOLVED | Noted: 2023-01-10 | Resolved: 2023-05-15

## 2023-05-15 PROBLEM — R82.71 BACTERIURIA DURING PREGNANCY: Status: RESOLVED | Noted: 2022-12-29 | Resolved: 2023-05-15

## 2023-05-15 LAB
BV WHIFF TEST VAG QL: NEGATIVE
CLUE CELLS SPEC QL WET PREP: NEGATIVE
PH SMN: 3.5 [PH]
SL AMB POCT WET MOUNT: ABNORMAL
T VAGINALIS VAG QL WET PREP: NEGATIVE
YEAST VAG QL WET PREP: POSITIVE

## 2023-05-15 RX ORDER — FLUCONAZOLE 150 MG/1
150 TABLET ORAL
Qty: 3 TABLET | Refills: 0 | Status: SHIPPED | OUTPATIENT
Start: 2023-05-15 | End: 2023-05-22

## 2023-05-15 NOTE — PROGRESS NOTES
"PROBLEM GYNECOLOGICAL VISIT    Bety Agudelo is a 25 y o  female who presents today with complaint of vaginal discharge  Her general medical history has been reviewed and she reports it as follows:    Past Medical History:   Diagnosis Date   • Communication problem 2023   • Exercise involving walking     2x/week   • Pregnant      Past Surgical History:   Procedure Laterality Date   • NO PAST SURGERIES     • [place] INDUCED  DILATION & EVACUATION N/A 2023    Procedure: DILATATION AND EVACUATION (D&E) (18 WEEKS); Surgeon: Kvng Ling DO;  [facility];  Service: Gynecology     OB History        1    Para   0    Term   0       0    AB   1    Living   0       SAB        IAB   1    Ectopic        Multiple        Live Births   0               Social History     Tobacco Use   • Smoking status: Never   • Smokeless tobacco: Never   Vaping Use   • Vaping Use: Never used   Substance Use Topics   • Alcohol use: Never   • Drug use: Never     Social History     Substance and Sexual Activity   Sexual Activity Yes   • Partners: Male       Current Outpatient Medications   Medication Instructions   • cyclobenzaprine (FLEXERIL) 5 mg, Oral, 3 times daily PRN   • ibuprofen (MOTRIN) 600 mg, Oral, Every 6 hours PRN       History of Present Illness:   Marie Wilkinson presents today reporting 1-2 months of vaginal discharge  Discharge is thick, clumpy and green  She has also had worsening itching, irritation and vaginal discomfort  No new sexual partners  Review of Systems:  Review of Systems   Gastrointestinal: Negative  Genitourinary: Positive for vaginal discharge and vaginal pain  Physical Exam:  BP 92/67 (BP Location: Right arm, Patient Position: Sitting, Cuff Size: Adult)   Pulse (!) 142   Resp 18   Ht 5' 3\" (1 6 m)   Wt 43 5 kg (95 lb 12 8 oz)   LMP 2023 (Exact Date)   BMI 16 97 kg/m²   Physical Exam  Constitutional:       General: She is not in acute distress       " Appearance: Normal appearance  Genitourinary:      Vulva normal       Vaginal discharge, erythema and tenderness present  Vaginal exam comments: Copious amount of thick, clumpy, off white discharge adherent to vaginal walls and cervix   Neurological:      Mental Status: She is alert  Skin:     General: Skin is warm and dry  Psychiatric:         Mood and Affect: Mood normal          Behavior: Behavior normal    Vitals reviewed  Point of Care Testing:   -Wet mount: +yeast, -clue cells, -trich    -KOH mount: +yeast   -Whiff: negative    -pH 3 5     Assessment:   1  Vulvovaginal candidiasis     Plan:   1  Patient with significant yeast vaginitis    2  Rx for Diflucan  Will take q3 days for 3 doses  3  Will return if symptoms are not resolved  4  Return for annual exam     Reviewed with patient that [facility], but that they will not necessarily be reviewed by me immediately  Explained that I will review results at my earliest opportunity and contact patient appropriately

## 2024-03-19 ENCOUNTER — HOSPITAL ENCOUNTER (EMERGENCY)
Facility: HOSPITAL | Age: 29
Discharge: HOME | End: 2024-03-19
Attending: EMERGENCY MEDICINE
Payer: COMMERCIAL

## 2024-03-19 ENCOUNTER — APPOINTMENT (EMERGENCY)
Dept: RADIOLOGY | Facility: HOSPITAL | Age: 29
End: 2024-03-19
Payer: COMMERCIAL

## 2024-03-19 VITALS
TEMPERATURE: 98.7 F | BODY MASS INDEX: 17.3 KG/M2 | DIASTOLIC BLOOD PRESSURE: 79 MMHG | OXYGEN SATURATION: 100 % | HEART RATE: 95 BPM | SYSTOLIC BLOOD PRESSURE: 130 MMHG | WEIGHT: 97.66 LBS | RESPIRATION RATE: 18 BRPM

## 2024-03-19 DIAGNOSIS — M54.6 ACUTE THORACIC BACK PAIN: Primary | ICD-10-CM

## 2024-03-19 DIAGNOSIS — N64.4 SORENESS BREAST: ICD-10-CM

## 2024-03-19 LAB
ATRIAL RATE: 91 BPM
EXT PREGNANCY TEST URINE: NEGATIVE
EXT. CONTROL: NORMAL
P AXIS: 29 DEGREES
PR INTERVAL: 144 MS
QRS AXIS: 70 DEGREES
QRSD INTERVAL: 78 MS
QT INTERVAL: 382 MS
QTC INTERVAL: 469 MS
T WAVE AXIS: 44 DEGREES
VENTRICULAR RATE: 91 BPM

## 2024-03-19 PROCEDURE — 99285 EMERGENCY DEPT VISIT HI MDM: CPT

## 2024-03-19 PROCEDURE — 71046 X-RAY EXAM CHEST 2 VIEWS: CPT

## 2024-03-19 PROCEDURE — 99283 EMERGENCY DEPT VISIT LOW MDM: CPT

## 2024-03-19 PROCEDURE — 96372 THER/PROPH/DIAG INJ SC/IM: CPT

## 2024-03-19 PROCEDURE — 93005 ELECTROCARDIOGRAM TRACING: CPT

## 2024-03-19 PROCEDURE — 93010 ELECTROCARDIOGRAM REPORT: CPT | Performed by: STUDENT IN AN ORGANIZED HEALTH CARE EDUCATION/TRAINING PROGRAM

## 2024-03-19 PROCEDURE — 81025 URINE PREGNANCY TEST: CPT

## 2024-03-19 RX ORDER — METHOCARBAMOL 500 MG/1
500 TABLET, FILM COATED ORAL ONCE
Status: COMPLETED | OUTPATIENT
Start: 2024-03-19 | End: 2024-03-19

## 2024-03-19 RX ORDER — KETOROLAC TROMETHAMINE 30 MG/ML
15 INJECTION, SOLUTION INTRAMUSCULAR; INTRAVENOUS ONCE
Status: COMPLETED | OUTPATIENT
Start: 2024-03-19 | End: 2024-03-19

## 2024-03-19 RX ORDER — METHOCARBAMOL 500 MG/1
500 TABLET, FILM COATED ORAL 2 TIMES DAILY
Qty: 20 TABLET | Refills: 0 | Status: SHIPPED | OUTPATIENT
Start: 2024-03-19

## 2024-03-19 RX ORDER — LIDOCAINE 50 MG/G
1 PATCH TOPICAL ONCE
Status: DISCONTINUED | OUTPATIENT
Start: 2024-03-19 | End: 2024-03-19 | Stop reason: HOSPADM

## 2024-03-19 RX ORDER — LIDOCAINE 50 MG/G
1 PATCH TOPICAL EVERY 24 HOURS
Qty: 15 PATCH | Refills: 0 | Status: SHIPPED | OUTPATIENT
Start: 2024-03-19 | End: 2024-04-03

## 2024-03-19 RX ORDER — NAPROXEN 500 MG/1
500 TABLET ORAL 2 TIMES DAILY WITH MEALS
Qty: 10 TABLET | Refills: 0 | Status: SHIPPED | OUTPATIENT
Start: 2024-03-19 | End: 2025-03-19

## 2024-03-19 RX ADMIN — METHOCARBAMOL TABLETS 500 MG: 500 TABLET, COATED ORAL at 14:33

## 2024-03-19 RX ADMIN — LIDOCAINE 1 PATCH: 50 PATCH CUTANEOUS at 14:33

## 2024-03-19 RX ADMIN — KETOROLAC TROMETHAMINE 15 MG: 30 INJECTION, SOLUTION INTRAMUSCULAR; INTRAVENOUS at 14:33

## 2024-03-19 NOTE — DISCHARGE INSTRUCTIONS
Follow up with PCP, OBGYN, comprehensive spine program. Return to ED for new or worsening symptoms as discussed.

## 2024-03-19 NOTE — ED PROVIDER NOTES
History  Chief Complaint   Patient presents with    Back Pain     Pt reports pain in her back and bilateral breasts that started yesterday. No meds PTA.      28-year-old female with no relevant past medical history presenting to emergency department complaining of thoracic back pain and bilateral breast soreness for 1 day.      History provided by:  Patient   used: Yes ([employer])    Back Pain  Location:  Thoracic spine  Radiates to: b/l breasts.  Context: not emotional stress, not jumping from heights, not lifting heavy objects, not recent illness and not recent injury    Relieved by: palpation.  Associated symptoms: no abdominal pain, no abdominal swelling, no bladder incontinence, no bowel incontinence, no chest pain, no dysuria, no fever, no headaches, no leg pain, no numbness, no paresthesias, no pelvic pain, no perianal numbness, no tingling and no weakness    Associated symptoms comment:  B/l breast soreness. Denies breast skin changes, swelling, nipple discharge, bleeding, masses.   Risk factors comment:  Denies use of hormonal contraceptive. denies personal or fhx of clotting disorders. no recent surgeries or bed rest. no trauma.      Prior to Admission Medications   Prescriptions Last Dose Informant Patient Reported? Taking?   cyclobenzaprine (FLEXERIL) 5 mg tablet   No No   Sig: Take 1 tablet (5 mg total) by mouth 3 (three) times a day as needed for muscle spasms   ibuprofen (MOTRIN) 600 mg tablet   No No   Sig: Take 1 tablet (600 mg total) by mouth every 6 (six) hours as needed for mild pain (cramps)      Facility-Administered Medications: None       Past Medical History:   Diagnosis Date    Communication problem 2023    Exercise involving walking     2x/week    Pregnant        Past Surgical History:   Procedure Laterality Date    NO PAST SURGERIES      ID INDUCED  DILATION & EVACUATION N/A 2023    Procedure: DILATATION AND EVACUATION (D&E) (18 WEEKS);  Surgeon: Cecille  JOÃO Alvarado DO;  [facility];  Service: Gynecology       History reviewed. No pertinent family history.  I have reviewed and agree with the history as documented.    E-Cigarette/Vaping    E-Cigarette Use Never User      E-Cigarette/Vaping Substances    Nicotine No     THC No     CBD No     Flavoring No     Other No     Unknown No      Social History     Tobacco Use    Smoking status: Never    Smokeless tobacco: Never   Vaping Use    Vaping status: Never Used   Substance Use Topics    Alcohol use: Never    Drug use: Never       Review of Systems   Constitutional:  Negative for chills, diaphoresis and fever.   Respiratory:  Negative for cough and shortness of breath.    Cardiovascular:  Negative for chest pain, palpitations and leg swelling.   Gastrointestinal:  Negative for abdominal pain, bowel incontinence and vomiting.   Genitourinary:  Negative for bladder incontinence, dysuria and pelvic pain.   Musculoskeletal:  Positive for back pain. Negative for gait problem, myalgias and neck pain.   Skin:  Negative for color change and rash.   Neurological:  Negative for tingling, syncope, weakness, numbness, headaches and paresthesias.   All other systems reviewed and are negative.      Physical Exam  Physical Exam  Vitals and nursing note reviewed. Exam conducted with a chaperone present (RN).   Constitutional:       General: She is awake. She is not in acute distress.     Appearance: Normal appearance. She is not ill-appearing, toxic-appearing or diaphoretic.   HENT:      Head: Normocephalic and atraumatic.      Mouth/Throat:      Lips: Pink.      Mouth: Mucous membranes are moist.   Eyes:      General: Vision grossly intact.      Pupils: Pupils are equal, round, and reactive to light.   Cardiovascular:      Rate and Rhythm: Normal rate and regular rhythm.      Pulses:           Radial pulses are 2+ on the right side and 2+ on the left side.        Dorsalis pedis pulses are 2+ on the right side and 2+ on the  left side.      Heart sounds: Normal heart sounds.   Pulmonary:      Effort: Pulmonary effort is normal. No respiratory distress.      Breath sounds: Normal breath sounds. No stridor. No wheezing, rhonchi or rales.      Comments: Patient in no respiratory distress, speaking in full sentences, managing oral secretions without difficulty, no accessory muscle use, retractions, or belly breathing noted, no adventitious lung sounds auscultated bilaterally.  Chest:      Chest wall: No tenderness.   Breasts:     Right: Normal.      Left: Normal.   Abdominal:      General: There is no distension.      Palpations: Abdomen is soft.      Tenderness: There is no abdominal tenderness.   Musculoskeletal:      Cervical back: Neck supple.      Right lower leg: No edema.      Left lower leg: No edema.      Comments: No midline spinal tenderness, deformities, crepitus, step-off, skin changes noted to the area of pain. +bilateral thoracic paraspinal muscular tenderness. No unilateral leg swelling.    Skin:     General: Skin is warm and dry.      Capillary Refill: Capillary refill takes less than 2 seconds.      Findings: No bruising, erythema or rash.   Neurological:      Mental Status: She is alert.      Sensory: No sensory deficit.      Motor: No weakness.      Gait: Gait normal.   Psychiatric:         Mood and Affect: Mood normal.         Behavior: Behavior normal.         Vital Signs  ED Triage Vitals [03/19/24 1403]   Temperature Pulse Respirations Blood Pressure SpO2   98.7 °F (37.1 °C) 95 18 130/79 100 %      Temp Source Heart Rate Source Patient Position - Orthostatic VS BP Location FiO2 (%)   Oral Monitor Sitting Left arm --      Pain Score       --           Vitals:    03/19/24 1403   BP: 130/79   Pulse: 95   Patient Position - Orthostatic VS: Sitting         Visual Acuity      ED Medications  Medications   ketorolac (TORADOL) injection 15 mg (15 mg Intramuscular Given 3/19/24 1433)   methocarbamol (ROBAXIN) tablet 500 mg  (500 mg Oral Given 3/19/24 1433)       Diagnostic Studies  Results Reviewed       Procedure Component Value Units Date/Time    POCT pregnancy, urine [[id number]]  (Normal) Resulted: 03/19/24 1421    Lab Status: Final result Updated: 03/19/24 1421     EXT Preg Test, Ur Negative     Control Valid                   XR chest 2 views   Final Result by Anne Gomez MD (03/19 1707)      No acute cardiopulmonary disease.            Workstation performed: [id number]                    Procedures  Procedures         ED Course  ED Course as of 03/20/24 1454   Tue Mar 19, 2024   1428 Thoracic back pain, breast pain. Atraumatic. Feels better with palpation of thoracic paraspinal muscles. No other symptoms. No systemic symptoms. No birth control or history of DVTs.    1454 Procedure Note: EKG  Date/Time: 03/19/24 2:54 PM   Performed by: Ria Abdullahi   Authorized by: Ria Abdullahi  ECG interpreted by me, the ED Provider: yes   The EKG demonstrates:  Rate 91 bpm  Rhythm NSR with Sinus Arrhythmia   QTc 469 ms   No ST elevations/depressions                         PERC Rule for PE      Flowsheet Row Most Recent Value   PERC Rule for PE    Age >=50 0 Filed at: 03/19/2024 1429   HR >=100 0 Filed at: 03/19/2024 1429   O2 Sat on room air < 95% 0 Filed at: 03/19/2024 1429   History of PE or DVT 0 Filed at: 03/19/2024 1429   Recent trauma or surgery 0 Filed at: 03/19/2024 1429   Hemoptysis 0 Filed at: 03/19/2024 1429   Exogenous estrogen 0 Filed at: 03/19/2024 1429   Unilateral leg swelling 0 Filed at: 03/19/2024 1429   PERC Rule for PE Results 0 Filed at: 03/19/2024 1429                SBIRT 22yo+      Flowsheet Row Most Recent Value   Initial Alcohol Screen: US AUDIT-C     1. How often do you have a drink containing alcohol? 0 Filed at: 03/19/2024 1405   2. How many drinks containing alcohol do you have on a typical day you are drinking?  0 Filed at: 03/19/2024 1405   3a. Male UNDER 65: How often do you have five or more drinks on  "one occasion? 0 Filed at: 03/19/2024 1402   3b. FEMALE Any Age, or MALE 65+: How often do you have 4 or more drinks on one occassion? 0 Filed at: 03/19/2024 1402   Audit-C Score 0 Filed at: 03/19/2024 1401   NATALIE: How many times in the past year have you...    Used an illegal drug or used a prescription medication for non-medical reasons? Never Filed at: 03/19/2024 1408                      Medical Decision Making  Differential diagnosis includes but is not limited to muscle strain, pneumonia, pneumothorax, pregnancy, PE.  Negative urine hCG.  ECG without acute ischemic changes or dysrhythmias.  PERC negative, further workup for PE including blood work, imaging not indicated at this time.  Chest x-ray without acute cardiopulmonary disease on my read including no evidence of pneumonia or pneumothorax.  No midline back tenderness or step-offs or deformities or neurologic deficits, spinal imaging not indicated at this time, will give comprehensive spine follow-up.  Discussed with Attending, patient stable for discharge, no further workup indicated. Pcp, gyn follow up.     All imaging and/or lab testing discussed with patient, strict return to ED precautions discussed. Patient recommended to follow up promptly with appropriate outpatient provider. Patient and/or family members verbalizes understanding and agrees with plan. Patient and/or family members were given opportunity to ask questions, all questions were answered at this time. Patient is stable for discharge.     Portions of the record may have been created with voice recognition software. Occasional wrong word or \"sound a like\" substitutions may have occurred due to the inherent limitations of voice recognition software. Read the chart carefully and recognize, using context, where substitutions have occurred.       Amount and/or Complexity of Data Reviewed  Labs: ordered.  Radiology: ordered.    Risk  Prescription drug management.             Disposition  Final " diagnoses:   Acute thoracic back pain   Soreness breast     Time reflects when diagnosis was documented in both MDM as applicable and the Disposition within this note       Time User Action Codes Description Comment    3/19/2024  3:07 PM Ria Abdullahi [M54.6] Acute thoracic back pain     3/19/2024  3:07 PM Ria Abdullahi Add [N64.4] Soreness breast           ED Disposition       ED Disposition   Discharge    Condition   Stable    Date/Time   Tue Mar 19, 2024 1509    Comment   Luis Beaulieu discharge to home/self care.                   Follow-up Information       Follow up With Specialties Details Why Contact Info Additional Information    [facility] Schedule an appointment as soon as possible for a visit  For follow up regarding your symptoms [address] [place] [address], [facility] [address]  [facility] [address] [phone] [facility], [address] [place] [address], [address], [employer], [place], [id number] [phone]    [facility] Schedule an appointment as soon as possible for a visit  For follow up regarding your breast symptoms [address] [place] [address]  [address]  [facility] [address]  [phone] [facility], [address] [place] [address], [address], [employer], [place], [id number] [phone]            Discharge Medication List as of 3/19/2024  3:09 PM        START taking these medications    Details   Diclofenac Sodium (VOLTAREN) 1 % Apply 2 g topically 4 (four) times a day, Starting Tue 3/19/2024, Normal      lidocaine (LIDODERM) 5 % Apply 1 patch topically over 12 hours every 24 hours for 15 days Remove & Discard patch within 12 hours or as directed by MD, Starting Tue 3/19/2024, Until Wed 4/3/2024, Normal      methocarbamol (ROBAXIN) 500 mg tablet Take 1 tablet (500 mg total) by mouth 2 (two) times a day, Starting Tue 3/19/2024, Normal      naproxen (EC  NAPROSYN) 500 MG EC tablet Take 1 tablet (500 mg total) by mouth 2 (two) times a day with meals, Starting Tue 3/19/2024, Until Wed 3/19/2025, Normal           CONTINUE these medications which have NOT CHANGED    Details   cyclobenzaprine (FLEXERIL) 5 mg tablet Take 1 tablet (5 mg total) by mouth 3 (three) times a day as needed for muscle spasms, Starting Wed 2/15/2023, Normal      ibuprofen (MOTRIN) 600 mg tablet Take 1 tablet (600 mg total) by mouth every 6 (six) hours as needed for mild pain (cramps), Starting u 2/9/2023, No Print                 PDMP Review       None            ED Provider  Electronically Signed by             Ria Abdullahi PA-C  03/20/24 7068

## 2024-03-20 ENCOUNTER — TELEPHONE (OUTPATIENT)
Dept: PHYSICAL THERAPY | Facility: OTHER | Age: 29
End: 2024-03-20

## 2024-03-20 NOTE — TELEPHONE ENCOUNTER
Attempt to reach the patient per Comprehensive [facility] Program referral.    Waited 15 minutes for a DAKSHA  but no one is available at the moment. Will try again later.    Will defer referral for 3 days per protocol.

## 2024-03-28 NOTE — TELEPHONE ENCOUNTER
Call placed to the patient per [facility] referral.    Unable to connect with a [employer] . Left a VM in English since this was our 2nd attempt to contact her and inability to get a [employer]  both attempts.    Voice message left for patient to call back. Phone number and hours of business provided.     This the 2nd attempt to reach the patient.   Will defer per protocol.

## 2024-04-11 ENCOUNTER — OFFICE VISIT (OUTPATIENT)
Dept: OBGYN CLINIC | Facility: CLINIC | Age: 29
End: 2024-04-11
Payer: COMMERCIAL

## 2024-04-11 VITALS — SYSTOLIC BLOOD PRESSURE: 120 MMHG | DIASTOLIC BLOOD PRESSURE: 64 MMHG

## 2024-04-11 DIAGNOSIS — Z31.69 INFERTILITY COUNSELING: Primary | ICD-10-CM

## 2024-04-11 PROBLEM — O43.022 TWIN TO TWIN TRANSFUSION IN SECOND TRIMESTER: Status: RESOLVED | Noted: 2023-02-07 | Resolved: 2024-04-11

## 2024-04-11 PROBLEM — O30.032 MONOCHORIONIC DIAMNIOTIC TWIN GESTATION IN SECOND TRIMESTER: Status: RESOLVED | Noted: 2022-12-06 | Resolved: 2024-04-11

## 2024-04-11 PROBLEM — B37.31 YEAST VAGINITIS: Status: RESOLVED | Noted: 2023-01-10 | Resolved: 2024-04-11

## 2024-04-11 PROCEDURE — 99203 OFFICE O/P NEW LOW 30 MIN: CPT | Performed by: STUDENT IN AN ORGANIZED HEALTH CARE EDUCATION/TRAINING PROGRAM

## 2024-04-11 NOTE — ASSESSMENT & PLAN NOTE
-discussed appropriate timing of intercourse prior to ovulation  -Pelvic US and Tsh ordered   -information for [facility] provided today, pt and partner encouraged to follow up with them to consider ART  -follow up prn/annual

## 2024-04-11 NOTE — PROGRESS NOTES
Assessment/Plan:    Infertility counseling  -discussed appropriate timing of intercourse prior to ovulation  -Pelvic US and Tsh ordered   -information for [facility] provided today, pt and partner encouraged to follow up with them to consider ART  -follow up prn/annual        Diagnoses and all orders for this visit:    Infertility counseling  -     TSH, 3rd generation with Free T4 reflex; Future  -     US pelvis complete w transvaginal; Future          Subjective:      Patient ID: Luis Beaulieu is a 29 y.o. female.    28yo  LMP 3/30/24 presents for second opinion of infertility counseling. Pt reports regular monthly menstrual cycles. Pt and partner have been attempting to conceive for 1 year. They have intercourse 10 times per month and time it around her ovulation. She is not using ovulatory kits. She was seen at [facility] in December for infertility consult but did not complete the workup as her insurance only covers [facility].         The following portions of the patient's history were reviewed and updated as appropriate: allergies, current medications, past family history, past medical history, past social history, past surgical history, and problem list.    Review of Systems   Constitutional:  Negative for appetite change, chills, fatigue and fever.   Respiratory:  Negative for cough, chest tightness, shortness of breath and wheezing.    Cardiovascular:  Negative for chest pain, palpitations and leg swelling.   Gastrointestinal:  Negative for abdominal distention, abdominal pain, constipation, diarrhea, nausea and vomiting.   Endocrine: Negative for cold intolerance, heat intolerance and polyuria.   Genitourinary:  Negative for difficulty urinating, dyspareunia, dysuria, genital sores, menstrual problem, vaginal bleeding, vaginal discharge and vaginal pain.   Neurological:  Negative for dizziness, weakness, light-headedness and headaches.         Objective:      /64 (BP Location: Right arm, Patient Position:  Sitting, Cuff Size: Standard)   LMP 03/30/2024 (Exact Date)          Physical Exam  Constitutional:       General: She is not in acute distress.     Appearance: Normal appearance. She is normal weight.   Cardiovascular:      Rate and Rhythm: Normal rate.   Pulmonary:      Effort: Pulmonary effort is normal. No respiratory distress.   Abdominal:      General: There is no distension.      Palpations: There is no mass.      Tenderness: There is no abdominal tenderness. There is no guarding or rebound.   Musculoskeletal:      Right lower leg: No edema.      Left lower leg: No edema.   Neurological:      Mental Status: She is alert and oriented to person, place, and time.   Psychiatric:         Mood and Affect: Mood normal.

## 2024-04-15 LAB — TSH SERPL-ACNC: 2.36 UIU/ML (ref 0.45–5.33)

## 2024-07-16 ENCOUNTER — NURSE TRIAGE (OUTPATIENT)
Age: 29
End: 2024-07-16

## 2024-07-16 NOTE — TELEPHONE ENCOUNTER
Regarding: Vaginal Discharge  ----- Message from Mar LONGORIA sent at 7/16/2024  2:50 PM EDT -----  Pt calling with vaginal discharge

## 2024-07-16 NOTE — TELEPHONE ENCOUNTER
"Call connected with [facility]eter #[id number].    White creamy discharge with intermittent fishy odor x 20 days.      Advised open to air at bedtime, avoid perfume soaps and products, recommend cotton underwear only, no thongs, wear looser fit clothing, change out of wet clothing after exercise and/or bathing suits ASAP. May use A&D/Aquaphor oint externally to alleviate irration, wash with Cetaphil cleanser, watch sugar and carb intake.     Attempted warm transfer to clerical. Per Ree, no appt currently available. She will call Luis back with date/time.    Advised Luis to expect c/b with appt time.  If symptoms worsen, proceed to urgent care for evaluation.    Patient verbalized understanding    HP IB message sent to [facility]  clerical and Dr. Baez    Reason for Disposition   Bad smelling vaginal discharge    Answer Assessment - Initial Assessment Questions  1. DISCHARGE: \"Describe the discharge.\" (e.g., white, yellow, green, gray, foamy, cottage cheese-like)      White creamy discharge  2. ODOR: \"Is there a bad odor?\"      Yes-varies sometimes has odor and is fishy  3. ONSET: \"When did the discharge begin?\"      20 days ago  4. RASH: \"Is there a rash in that area?\" If Yes, ask: \"Describe it.\" (e.g., redness, blisters, sores, bumps)      no  5. ABDOMINAL PAIN: \"Are you having any abdominal pain?\" If Yes, ask: \"What does it feel like? \" (e.g., crampy, dull, intermittent, constant)       denies  6. ABDOMINAL PAIN SEVERITY: If present, ask: \"How bad is it?\"  (e.g., mild, moderate, severe)   - MILD - doesn't interfere with normal activities    - MODERATE - interferes with normal activities or awakens from sleep    - SEVERE - patient doesn't want to move (R/O peritonitis)       N/a  7. CAUSE: \"What do you think is causing the discharge?\" \"Have you had the same problem before? What happened then?\"      Yes, few years ago. Given medication  8. OTHER SYMPTOMS: \"Do you have any other symptoms?\" (e.g., fever, " "itching, vaginal bleeding, pain with urination, injury to genital area, vaginal foreign body)      Mild itching  9. PREGNANCY: \"Is there any chance you are pregnant?\" \"When was your last menstrual period?\"      LMP last monday    Protocols used: Vaginal Discharge-ADULT-[place]    "

## 2024-07-19 ENCOUNTER — OFFICE VISIT (OUTPATIENT)
Dept: OBGYN CLINIC | Facility: CLINIC | Age: 29
End: 2024-07-19

## 2024-07-19 VITALS
HEART RATE: 98 BPM | HEIGHT: 63 IN | DIASTOLIC BLOOD PRESSURE: 80 MMHG | SYSTOLIC BLOOD PRESSURE: 128 MMHG | WEIGHT: 101.9 LBS | BODY MASS INDEX: 18.05 KG/M2

## 2024-07-19 DIAGNOSIS — N89.8 VAGINAL DISCHARGE: Primary | ICD-10-CM

## 2024-07-19 LAB
BV WHIFF TEST VAG QL: NEGATIVE
CLUE CELLS SPEC QL WET PREP: POSITIVE
T VAGINALIS VAG QL WET PREP: NEGATIVE
YEAST VAG QL WET PREP: NEGATIVE

## 2024-07-19 PROCEDURE — 87210 SMEAR WET MOUNT SALINE/INK: CPT | Performed by: OBSTETRICS & GYNECOLOGY

## 2024-07-19 PROCEDURE — 99213 OFFICE O/P EST LOW 20 MIN: CPT | Performed by: OBSTETRICS & GYNECOLOGY

## 2024-07-19 RX ORDER — METRONIDAZOLE 500 MG/1
500 TABLET ORAL EVERY 12 HOURS SCHEDULED
Qty: 14 TABLET | Refills: 0 | Status: SHIPPED | OUTPATIENT
Start: 2024-07-19 | End: 2024-07-26

## 2024-07-19 NOTE — PROGRESS NOTES
PROBLEM GYNECOLOGICAL VISIT    Luis Beaulieu is a 29 y.o. female who presents today with complaint of concern for yeast infection.  Her general medical history has been reviewed and she reports it as follows:    Past Medical History:   Diagnosis Date    Communication problem 2023    Exercise involving walking     2x/week    Pregnant      Past Surgical History:   Procedure Laterality Date    NO PAST SURGERIES      IA INDUCED  DILATION & EVACUATION N/A 2023    Procedure: DILATATION AND EVACUATION (D&E) (18 WEEKS);  Surgeon: Cecille Alvarado DO;  Location: [facility];  Service: Gynecology     OB History          1    Para   0    Term   0       0    AB   1    Living   0         SAB        IAB   1    Ectopic        Multiple        Live Births   0               Social History     Tobacco Use    Smoking status: Never    Smokeless tobacco: Never   Vaping Use    Vaping status: Never Used   Substance Use Topics    Alcohol use: Never    Drug use: Never     Social History     Substance and Sexual Activity   Sexual Activity Yes    Partners: Male    Birth control/protection: None     Current Outpatient Medications   Medication Instructions    metroNIDAZOLE (FLAGYL) 500 mg, Oral, Every 12 hours scheduled     History of Present Illness:    Santa, [id number]. 29 year old female presenting for concern of thin green odorous discharge for close to over 3 weeks. Associated with pruritus, no erythema or associated pain. Has had similar discharge in the past, was given a pill for it but does not recall what kind of pill. She knows she took the pill for 3 days. Endorses that she is currently with her menses.     Review of Systems:  Review of Systems   Constitutional:  Negative for fatigue.   Respiratory:  Negative for chest tightness.    Cardiovascular:  Negative for chest pain.   Gastrointestinal:  Negative for abdominal pain.   Genitourinary:  Positive for vaginal bleeding and vaginal discharge.  "Negative for difficulty urinating.   Musculoskeletal:  Negative for back pain.   Skin:  Negative for rash and wound.   Neurological:  Negative for dizziness, numbness and headaches.       Physical Exam:  /80 (BP Location: Right arm, Patient Position: Sitting)   Pulse 98   Ht 5' 3\" (1.6 m)   Wt 46.2 kg (101 lb 14.4 oz)   LMP 07/18/2024 (Exact Date)   BMI 18.05 kg/m²   Physical Exam  Constitutional:       General: She is not in acute distress.     Appearance: She is normal weight.   Genitourinary:      Vaginal discharge and bleeding present.   HENT:      Head: Normocephalic and atraumatic.   Cardiovascular:      Rate and Rhythm: Normal rate.   Pulmonary:      Effort: Pulmonary effort is normal.   Abdominal:      General: Bowel sounds are normal.   Musculoskeletal:         General: Normal range of motion.   Neurological:      General: No focal deficit present.      Mental Status: She is alert.   Skin:     General: Skin is warm and dry.      Capillary Refill: Capillary refill takes less than 2 seconds.   Psychiatric:         Attention and Perception: Attention normal.         Mood and Affect: Mood is anxious.   Vitals reviewed. Exam conducted with a chaperone present.       Point of Care Testing:   -Wet mount: Scattered Bacterial Vaginosis   -KOH mount: Negative   -Whiff: Negative   -urine pregnancy test: N/A    Assessment/Plan:   Vaginal discharge  29 year old female presenting with odorous green vaginal discharge x20d. Similar to prior discharge 1 year ago. No fever, pain, no urinary symptoms including frequency/urgency. + Clue cells on wet mount, suspicious for bacterial vaginosis. Will send one week of Flagyl 500mg BID per patient preference, may follow up as needed if symptoms worsen or fail to improve.     Reviewed with patient that [facility], but that they will not necessarily be reviewed by me immediately.  Explained that I will review results at my earliest " opportunity and contact patient appropriately.

## 2024-07-19 NOTE — ASSESSMENT & PLAN NOTE
29 year old female presenting with odorous green vaginal discharge x20d. Similar to prior discharge 1 year ago. No fever, pain, no urinary symptoms including frequency/urgency. + Clue cells on wet mount, suspicious for bacterial vaginosis. Will send one week of Flagyl 500mg BID per patient preference, may follow up as needed if symptoms worsen or fail to improve.

## 2024-08-02 ENCOUNTER — OFFICE VISIT (OUTPATIENT)
Dept: OBGYN CLINIC | Facility: CLINIC | Age: 29
End: 2024-08-02
Payer: COMMERCIAL

## 2024-08-02 VITALS — BODY MASS INDEX: 18.03 KG/M2 | WEIGHT: 101.8 LBS | DIASTOLIC BLOOD PRESSURE: 76 MMHG | SYSTOLIC BLOOD PRESSURE: 120 MMHG

## 2024-08-02 DIAGNOSIS — B37.31 VULVAL CANDIDIASIS: ICD-10-CM

## 2024-08-02 DIAGNOSIS — N76.0 BACTERIAL VAGINITIS: ICD-10-CM

## 2024-08-02 DIAGNOSIS — Z11.3 SCREEN FOR STD (SEXUALLY TRANSMITTED DISEASE): ICD-10-CM

## 2024-08-02 DIAGNOSIS — B96.89 BACTERIAL VAGINITIS: ICD-10-CM

## 2024-08-02 LAB
CLUE CELLS SPEC QL WET PREP: ABNORMAL
T VAGINALIS VAG QL WET PREP: ABNORMAL
YEAST VAG QL WET PREP: ABNORMAL

## 2024-08-02 PROCEDURE — 87591 N.GONORRHOEAE DNA AMP PROB: CPT | Performed by: PHYSICIAN ASSISTANT

## 2024-08-02 PROCEDURE — 99214 OFFICE O/P EST MOD 30 MIN: CPT | Performed by: PHYSICIAN ASSISTANT

## 2024-08-02 PROCEDURE — 87661 TRICHOMONAS VAGINALIS AMPLIF: CPT | Performed by: PHYSICIAN ASSISTANT

## 2024-08-02 PROCEDURE — 87210 SMEAR WET MOUNT SALINE/INK: CPT | Performed by: PHYSICIAN ASSISTANT

## 2024-08-02 PROCEDURE — 87491 CHLMYD TRACH DNA AMP PROBE: CPT | Performed by: PHYSICIAN ASSISTANT

## 2024-08-02 RX ORDER — FLUCONAZOLE 150 MG/1
150 TABLET ORAL
Qty: 3 TABLET | Refills: 0 | Status: SHIPPED | OUTPATIENT
Start: 2024-08-02 | End: 2024-08-09

## 2024-08-02 RX ORDER — METRONIDAZOLE 7.5 MG/G
1 GEL VAGINAL
Qty: 70 G | Refills: 0 | Status: SHIPPED | OUTPATIENT
Start: 2024-08-02 | End: 2024-08-07

## 2024-08-06 LAB
C TRACH DNA SPEC QL NAA+PROBE: NEGATIVE
N GONORRHOEA DNA SPEC QL NAA+PROBE: NEGATIVE
T VAGINALIS DNA SPEC QL NAA+PROBE: NEGATIVE

## 2024-08-07 ENCOUNTER — TELEPHONE (OUTPATIENT)
Age: 29
End: 2024-08-07

## 2024-08-07 NOTE — TELEPHONE ENCOUNTER
Pt called in regarding her test results. Reviewed note from Dr. Baez stating GCCT and trich testing is negative. Pt verbalized understanding. States she completed medication given at office visit on 8/2/24 and symptoms improved. She has no further questions/concerns.

## 2024-08-12 ENCOUNTER — NURSE TRIAGE (OUTPATIENT)
Age: 29
End: 2024-08-12

## 2024-08-12 NOTE — TELEPHONE ENCOUNTER
"Patient is calling in, she was seen on 8/2 and was dx with yeast and BV. She completed all her medication and states she still thinks she has a yeast infection. She has a green creamy discharge and vaginal itching. She denies any vaginal odor. She would like to know if she could have more Fluconazole?        Answer Assessment - Initial Assessment Questions  1. DISCHARGE: \"Describe the discharge.\" (e.g., white, yellow, green, gray, foamy, cottage cheese-like)      Green discharge  2. ODOR: \"Is there a bad odor?\"      Denies   3. ONSET: \"When did the discharge begin?\"       On and off since July 4. RASH: \"Is there a rash in that area?\" If Yes, ask: \"Describe it.\" (e.g., redness, blisters, sores, bumps)      Denies   5. ABDOMINAL PAIN: \"Are you having any abdominal pain?\" If Yes, ask: \"What does it feel like? \" (e.g., crampy, dull, intermittent, constant)       Denies   6. ABDOMINAL PAIN SEVERITY: If present, ask: \"How bad is it?\"  (e.g., mild, moderate, severe)   - MILD - doesn't interfere with normal activities    - MODERATE - interferes with normal activities or awakens from sleep    - SEVERE - patient doesn't want to move (R/O peritonitis)       Denies   7. CAUSE: \"What do you think is causing the discharge?\" \"Have you had the same problem before? What happened then?\"      Unsure   8. OTHER SYMPTOMS: \"Do you have any other symptoms?\" (e.g., fever, itching, vaginal bleeding, pain with urination, injury to genital area, vaginal foreign body)      Mild itching    Protocols used: Vaginal Discharge-ADULT-[place]    "

## 2024-08-12 NOTE — TELEPHONE ENCOUNTER
Regarding: Yeast infection symptoms  ----- Message from Hayde BAEZ sent at 8/12/2024  3:29 PM EDT -----  Pt called reporting she continues with yeast infection symptoms consisting of vaginal discharge and itching. Pt reports finishing DIFLUCAN medication, however, symptoms continue. She is a pt of [facility].

## 2024-08-15 ENCOUNTER — TELEPHONE (OUTPATIENT)
Dept: OBGYN CLINIC | Facility: CLINIC | Age: 29
End: 2024-08-15

## 2024-08-15 NOTE — TELEPHONE ENCOUNTER
----- Message from Pamela Baez DO sent at 8/6/2024  3:25 PM EDT -----  Please let patient know her GCCT and trich testing is negative. thanks

## 2024-08-21 ENCOUNTER — NURSE TRIAGE (OUTPATIENT)
Age: 29
End: 2024-08-21

## 2024-08-21 NOTE — TELEPHONE ENCOUNTER
"Incoming call from patient. States that she was treated with Diflucan but is still having symptoms for the past 2 weeks. Symptoms include thick white vaginal discharge and vaginal itching. Requesting appointment for evaluation. Appointment scheduled for first available 8/26.     Reason for Disposition   Patient wants to be seen    Answer Assessment - Initial Assessment Questions  1. SYMPTOM: \"What's the main symptom you're concerned about?\" (e.g., pain, itching, dryness)      Vaginal discharge - white, creamy.     3. ONSET: \"When did the  symptoms  start?\"      2 weeks ago      4. PAIN: \"Is there any pain?\" If Yes, ask: \"How bad is it?\" (Scale: 1-10; mild, moderate, severe)      Denies    5. ITCHING: \"Is there any itching?\" If Yes, ask: \"How bad is it?\" (Scale: 1-10; mild, moderate, severe)      Yes    6. CAUSE: \"What do you think is causing the discharge?\" \"Have you had the same problem before? What happened then?\"      Unknown    7. OTHER SYMPTOMS: \"Do you have any other symptoms?\" (e.g., fever, itching, vaginal bleeding, pain with urination, injury to genital area, vaginal foreign body)      Denies    8. PREGNANCY: \"Is there any chance you are pregnant?\" \"When was your last menstrual period?\"      Lmp 8/17    Protocols used: Vaginal Symptoms-ADULT-[place]    "

## 2024-08-21 NOTE — TELEPHONE ENCOUNTER
Regarding: pt called c/o vaginal discharge and itch x 2 weeks she took medications and still has the sympton  ----- Message from Emeli DUNCAN sent at 8/21/2024  1:25 PM EDT -----  Pt called stating she has a vaginal discharge and itch , she states she took medications and she still has the discharge x 2 weeks

## 2024-08-25 NOTE — PROGRESS NOTES
Assessment/Plan:    Bathe daily with dove bar soap. Rinse well and pat dry.  Diflucan ordered as requested. .  Avoid scratching.   No sex during treatment.   Healthy diet with adequate hydration encouraged.   Will wait on further treatment until results are received.   She is aware that we may consider treatment with 21 days of boric acid from a compounding pharmacy.   All questions answered.   Return to office for annual exam and sooner, if needed.        1. Vaginal discharge  -     fluconazole (DIFLUCAN) 150 mg tablet; Take 1 tablet (150 mg total) by mouth once for 1 dose  2. Vaginal pain  -     fluconazole (DIFLUCAN) 150 mg tablet; Take 1 tablet (150 mg total) by mouth once for 1 dose             Subjective:      Patient ID: Luis Beaulieu is a 29 y.o. female.    HPI    Luis Beaulieu is a 29 y.o.  female who is here today for a problem visit . Visit conducted today with the aid of a Michael  via [id number] #[id number].  Last in office on 24 with Mel Trujillo PA-C and treated fpr VVC, BV and screened for STI's.   She denies any improvement in symptoms.   She now has a thick white colored discharge with some external itching and burning.   No aggravating factors. A previously prescribed cream (unknown name) is effective for relied.   No new soaps or detergent.s.     Monthly menses x 3 days with moderate flow. Menses is acceptable.   Luis Beaulieu is sexually active with /.  Denies pain, bleeding or dryness.  She is  monogamous.   She uses nothing  for contraception.  She is not interested in STD screening today.   She denies vaginal discharge, itching, pelvic pain.   She has no urinary concerns, does not have incontinence.  No bowel concerns.     The following portions of the patient's history were reviewed and updated as appropriate: allergies, current medications, past family history, past medical history, past social history, past surgical history, and problem list.    Review of Systems    Constitutional: Negative.    Gastrointestinal:  Negative for abdominal pain, constipation, diarrhea, nausea and vomiting.   Genitourinary:  Positive for vaginal discharge and vaginal pain. Negative for decreased urine volume, difficulty urinating, dyspareunia, dysuria, frequency, genital sores, menstrual problem, pelvic pain, urgency and vaginal bleeding.   Musculoskeletal:  Negative for arthralgias and myalgias.   Skin: Negative.    Hematological:  Negative for adenopathy.   Psychiatric/Behavioral: Negative.     All other systems reviewed and are negative.        Objective:      /78 (BP Location: Right arm, Patient Position: Sitting, Cuff Size: Standard)   Wt 47 kg (103 lb 9.6 oz)   LMP 08/17/2024 (Exact Date)   BMI 18.35 kg/m²          Physical Exam  Vitals and nursing note reviewed.   Constitutional:       Appearance: Normal appearance. She is well-developed.   Genitourinary:     General: Normal vulva.      Exam position: Lithotomy position.      Labia:         Right: No rash, tenderness, lesion or injury.         Left: No rash, tenderness, lesion or injury.       Urethra: No prolapse, urethral pain, urethral swelling or urethral lesion.      Vagina: No signs of injury and foreign body. Vaginal discharge (thick curd like white) and tenderness present. No erythema or bleeding.      Cervix: No cervical motion tenderness, discharge, friability, lesion, erythema or cervical bleeding.      Uterus: Normal.       Adnexa: Right adnexa normal and left adnexa normal.        Right: No mass, tenderness or fullness.          Left: No mass, tenderness or fullness.        Rectum: No external hemorrhoid.      Comments: Quite guarded during pelvic exam. States the vaginal discomfort is just from this infection.   Lymphadenopathy:      Lower Body: No right inguinal adenopathy. No left inguinal adenopathy.   Skin:     General: Skin is warm and dry.   Neurological:      Mental Status: She is alert and oriented to person,  place, and time.   Psychiatric:         Mood and Affect: Mood normal.         Behavior: Behavior normal.

## 2024-08-26 ENCOUNTER — OFFICE VISIT (OUTPATIENT)
Dept: OBGYN CLINIC | Facility: CLINIC | Age: 29
End: 2024-08-26
Payer: COMMERCIAL

## 2024-08-26 VITALS — SYSTOLIC BLOOD PRESSURE: 130 MMHG | DIASTOLIC BLOOD PRESSURE: 78 MMHG | WEIGHT: 103.6 LBS | BODY MASS INDEX: 18.35 KG/M2

## 2024-08-26 DIAGNOSIS — N89.8 VAGINAL DISCHARGE: Primary | ICD-10-CM

## 2024-08-26 DIAGNOSIS — R10.2 VAGINAL PAIN: ICD-10-CM

## 2024-08-26 PROCEDURE — 99213 OFFICE O/P EST LOW 20 MIN: CPT | Performed by: NURSE PRACTITIONER

## 2024-08-26 PROCEDURE — 87510 GARDNER VAG DNA DIR PROBE: CPT | Performed by: NURSE PRACTITIONER

## 2024-08-26 PROCEDURE — 87480 CANDIDA DNA DIR PROBE: CPT | Performed by: NURSE PRACTITIONER

## 2024-08-26 PROCEDURE — 87660 TRICHOMONAS VAGIN DIR PROBE: CPT | Performed by: NURSE PRACTITIONER

## 2024-08-26 RX ORDER — FLUCONAZOLE 150 MG/1
150 TABLET ORAL ONCE
Qty: 1 TABLET | Refills: 0 | Status: SHIPPED | OUTPATIENT
Start: 2024-08-26 | End: 2024-08-26

## 2024-08-26 NOTE — PATIENT INSTRUCTIONS
Bathe daily with dove bar soap. Rinse well and pat dry.  Diflucan ordered as requested. .  Avoid scratching.   No sex during treatment.   Healthy diet with adequate hydration encouraged.   Will wait on further treatment until results are received.   She is aware that we may consider treatment with 21 days of boric acid from a compounding pharmacy.   All questions answered.   Return to office for annual exam and sooner, if needed.

## 2024-08-27 DIAGNOSIS — B37.31 VAGINAL YEAST INFECTION: ICD-10-CM

## 2024-08-27 DIAGNOSIS — N76.0 BV (BACTERIAL VAGINOSIS): Primary | ICD-10-CM

## 2024-08-27 DIAGNOSIS — B96.89 BV (BACTERIAL VAGINOSIS): Primary | ICD-10-CM

## 2024-08-27 LAB
CANDIDA RRNA VAG QL PROBE: DETECTED
G VAGINALIS RRNA GENITAL QL PROBE: DETECTED
T VAGINALIS RRNA GENITAL QL PROBE: NOT DETECTED

## 2024-08-27 RX ORDER — METRONIDAZOLE 500 MG/1
500 TABLET ORAL EVERY 12 HOURS SCHEDULED
Qty: 14 TABLET | Refills: 0 | Status: SHIPPED | OUTPATIENT
Start: 2024-08-27 | End: 2024-09-03

## 2024-12-18 NOTE — PROGRESS NOTES
"Assessment & Plan     1. Vulval candidiasis  -Clue cells and hyphae appreciated under microscope today.  Treatment sent for both.  Diflucan and MetroGel sent to pharmacy  - POCT wet mount  - fluconazole (DIFLUCAN) 150 mg tablet; Take 1 tablet (150 mg total) by mouth every third day for 3 doses  Dispense: 3 tablet; Refill: 0    2. Bacterial vaginitis  - POCT wet mount  - metroNIDAZOLE (METROGEL) 0.75 % vaginal gel; Insert 1 Application into the vagina daily at bedtime for 5 days  Dispense: 70 g; Refill: 0    3. Screen for STD (sexually transmitted disease)  -Patient is agreeable to STI screening today-cultures collected  - Chlamydia/GC amplified DNA by PCR  - Trichomonas vaginalis Thin prep      Subjective   Patient ID: Luis Beaulieu is a 29 y.o. female.    HPI    Patient presents to the office today for a problem visit.  Visit conducted today with the aid of a Michael  via [employer].  Patient was evaluated about 2 weeks ago with symptoms of discharge.  She was diagnosed with BV and treated with p.o. course of Flagyl.  After completing this treatment she noted brief resolution of her symptoms but only for 2 days.    Symptoms have since returned.  Today she reports a thick clumpy, \"yogurt like discharge.\"  She reports significant itching and irritation both externally and internally.  She denies vaginal odor, change in partner, concerns for STIs.  She denies fever, chills, abdominal pain, dysuria.  She is not currently using anything for contraception as they are hoping for pregnancy    The following portions of the patient's history were reviewed and updated as appropriate: allergies, current medications, past family history, past medical history, past social history, past surgical history, and problem list.    Review of Systems    Objective     Vitals:    08/02/24 1336   BP: 120/76       Physical Exam  Constitutional:       Appearance: Normal appearance.   Genitourinary:      Urethral meatus normal.      No " lesions in the vagina.      Genitourinary Comments: Mild irritative erythema noted on labia minora  Thick copious amount of white clumpy discharge noted in vaginal vault      Right Labia: No rash, tenderness or lesions.     Left Labia: No tenderness, lesions or rash.     Vaginal discharge present.      No vaginal bleeding.        Right Adnexa: not tender, not full and no mass present.     Left Adnexa: not tender, not full and no mass present.     No cervical motion tenderness, lesion, polyp or eversion.      Uterus is not enlarged or tender.      No uterine mass detected.     No urethral tenderness present.   HENT:      Head: Normocephalic and atraumatic.   Pulmonary:      Effort: Pulmonary effort is normal.   Abdominal:      General: Abdomen is flat.   Neurological:      General: No focal deficit present.      Mental Status: She is alert. Mental status is at baseline.   Skin:     General: Skin is warm and dry.   Psychiatric:         Mood and Affect: Mood normal.         Behavior: Behavior normal.         Thought Content: Thought content normal.         Judgment: Judgment normal.   Vitals reviewed.              05-Jun-2024

## (undated) DEVICE — STERILE SURGICAL LUBRICANT,  TUBE: Brand: SURGILUBE

## (undated) DEVICE — PVC URETHRAL CATHETER: Brand: DOVER

## (undated) DEVICE — COLLECTION SET, DISPOSABLE WITH HANDLE AND TAPERED FITTINGS TUBING, 6 FT (183 CM): Brand: GYRUS ACMI

## (undated) DEVICE — GLOVE INDICATOR PI UNDERGLOVE SZ 7 BLUE

## (undated) DEVICE — PREMIUM DRY TRAY LF: Brand: MEDLINE INDUSTRIES, INC.

## (undated) DEVICE — STRL ALLENTOWN HYSTEROSCOPY PK: Brand: CARDINAL HEALTH

## (undated) DEVICE — CHLORHEXIDINE 4PCT 4 OZ

## (undated) DEVICE — D + E SUCTION CANISTER

## (undated) DEVICE — SPONGE LAP 18 X 18 IN STRL RFD

## (undated) DEVICE — D + E SAFE TOUCH TISSUE TRAP (CIRCON)

## (undated) DEVICE — GLOVE PI ULTRA TOUCH SZ.6.5

## (undated) DEVICE — D + E CONNECTION HOSE